# Patient Record
Sex: FEMALE | Race: WHITE | NOT HISPANIC OR LATINO | Employment: FULL TIME | URBAN - METROPOLITAN AREA
[De-identification: names, ages, dates, MRNs, and addresses within clinical notes are randomized per-mention and may not be internally consistent; named-entity substitution may affect disease eponyms.]

---

## 2023-03-02 LAB — URINE CULTURE: NORMAL

## 2023-03-14 LAB
ABO GROUP (TYPE) IN BLOOD: NORMAL
ANTIBODY SCREEN: NORMAL
ERYTHROCYTE DISTRIBUTION WIDTH (RATIO) BY AUTOMATED COUNT: 13 % (ref 11.5–14.5)
ERYTHROCYTE MEAN CORPUSCULAR HEMOGLOBIN CONCENTRATION (G/DL) BY AUTOMATED: 32.7 G/DL (ref 32–36)
ERYTHROCYTE MEAN CORPUSCULAR VOLUME (FL) BY AUTOMATED COUNT: 91 FL (ref 80–100)
ERYTHROCYTES (10*6/UL) IN BLOOD BY AUTOMATED COUNT: 4.32 X10E12/L (ref 4–5.2)
HEMATOCRIT (%) IN BLOOD BY AUTOMATED COUNT: 39.5 % (ref 36–46)
HEMOGLOBIN (G/DL) IN BLOOD: 12.9 G/DL (ref 12–16)
HEPATITIS B VIRUS SURFACE AG PRESENCE IN SERUM: NONREACTIVE
HEPATITIS C VIRUS AB PRESENCE IN SERUM: NONREACTIVE
HIV 1/ 2 AG/AB SCREEN: NONREACTIVE
LEUKOCYTES (10*3/UL) IN BLOOD BY AUTOMATED COUNT: 8.2 X10E9/L (ref 4.4–11.3)
PLATELETS (10*3/UL) IN BLOOD AUTOMATED COUNT: 208 X10E9/L (ref 150–450)
REFLEX ADDED, ANEMIA PANEL: NORMAL
RH FACTOR: NORMAL
RUBELLA VIRUS IGG AB: POSITIVE
SYPHILIS TOTAL AB: NONREACTIVE

## 2023-04-04 LAB
CHLAMYDIA TRACH., AMPLIFIED: NEGATIVE
N. GONORRHEA, AMPLIFIED: NEGATIVE

## 2023-06-26 LAB
ERYTHROCYTE DISTRIBUTION WIDTH (RATIO) BY AUTOMATED COUNT: 12.5 % (ref 11.5–14.5)
ERYTHROCYTE MEAN CORPUSCULAR HEMOGLOBIN CONCENTRATION (G/DL) BY AUTOMATED: 32 G/DL (ref 32–36)
ERYTHROCYTE MEAN CORPUSCULAR VOLUME (FL) BY AUTOMATED COUNT: 98 FL (ref 80–100)
ERYTHROCYTES (10*6/UL) IN BLOOD BY AUTOMATED COUNT: 3.88 X10E12/L (ref 4–5.2)
GLUCOSE, 1 HR SCREEN, PREG: 99 MG/DL
HEMATOCRIT (%) IN BLOOD BY AUTOMATED COUNT: 38.1 % (ref 36–46)
HEMOGLOBIN (G/DL) IN BLOOD: 12.2 G/DL (ref 12–16)
LEUKOCYTES (10*3/UL) IN BLOOD BY AUTOMATED COUNT: 10.1 X10E9/L (ref 4.4–11.3)
PLATELETS (10*3/UL) IN BLOOD AUTOMATED COUNT: 198 X10E9/L (ref 150–450)
REFLEX ADDED, ANEMIA PANEL: ABNORMAL

## 2023-08-26 PROBLEM — Z72.89 OTHER PROBLEMS RELATED TO LIFESTYLE: Status: ACTIVE | Noted: 2023-08-26

## 2023-08-26 PROBLEM — L72.3 SEBACEOUS CYST: Status: ACTIVE | Noted: 2023-08-26

## 2023-08-26 PROBLEM — B96.89 BACTERIAL VAGINOSIS: Status: ACTIVE | Noted: 2023-08-26

## 2023-08-26 PROBLEM — N76.0 BACTERIAL VAGINOSIS: Status: ACTIVE | Noted: 2023-08-26

## 2023-08-26 PROBLEM — R87.612 LOW GRADE SQUAMOUS INTRAEPITHELIAL LESION (LGSIL) ON CERVICAL PAP SMEAR: Status: ACTIVE | Noted: 2023-08-26

## 2023-08-26 PROBLEM — N39.0 UTI (URINARY TRACT INFECTION): Status: ACTIVE | Noted: 2023-08-26

## 2023-08-26 PROBLEM — R53.83 FATIGUE: Status: ACTIVE | Noted: 2023-08-26

## 2023-08-26 PROBLEM — H92.03 OTALGIA OF BOTH EARS: Status: ACTIVE | Noted: 2023-08-26

## 2023-08-26 PROBLEM — R10.2 FEMALE PELVIC PAIN: Status: ACTIVE | Noted: 2023-08-26

## 2023-08-26 PROBLEM — D64.9 ANEMIA: Status: ACTIVE | Noted: 2023-08-26

## 2023-08-26 PROBLEM — R39.89 SUSPECTED UTI: Status: ACTIVE | Noted: 2023-08-26

## 2023-08-26 RX ORDER — CLINDAMYCIN PHOSPHATE, BENZOYL PEROXIDE 25; 10 MG/G; MG/G
GEL TOPICAL
COMMUNITY
End: 2023-10-04 | Stop reason: WASHOUT

## 2023-08-26 RX ORDER — LANOLIN ALCOHOL/MO/W.PET/CERES
1 CREAM (GRAM) TOPICAL DAILY
COMMUNITY
End: 2023-09-23 | Stop reason: ALTCHOICE

## 2023-08-26 RX ORDER — FERROUS SULFATE 325(65) MG
TABLET ORAL
COMMUNITY
End: 2023-10-08 | Stop reason: HOSPADM

## 2023-08-26 RX ORDER — NITROFURANTOIN 25; 75 MG/1; MG/1
1 CAPSULE ORAL 2 TIMES DAILY
COMMUNITY
Start: 2022-06-29 | End: 2023-09-23 | Stop reason: ALTCHOICE

## 2023-09-18 LAB — GROUP B STREP SCREEN: NORMAL

## 2023-10-04 ENCOUNTER — ROUTINE PRENATAL (OUTPATIENT)
Dept: OBSTETRICS AND GYNECOLOGY | Facility: CLINIC | Age: 28
End: 2023-10-04
Payer: COMMERCIAL

## 2023-10-04 VITALS
SYSTOLIC BLOOD PRESSURE: 118 MMHG | WEIGHT: 174.38 LBS | DIASTOLIC BLOOD PRESSURE: 74 MMHG | BODY MASS INDEX: 29.02 KG/M2

## 2023-10-04 DIAGNOSIS — Z3A.39 39 WEEKS GESTATION OF PREGNANCY (HHS-HCC): Primary | ICD-10-CM

## 2023-10-04 PROBLEM — Z72.89 OTHER PROBLEMS RELATED TO LIFESTYLE: Status: RESOLVED | Noted: 2023-08-26 | Resolved: 2023-10-04

## 2023-10-04 PROBLEM — R87.612 LOW GRADE SQUAMOUS INTRAEPITHELIAL LESION (LGSIL) ON CERVICAL PAP SMEAR: Status: RESOLVED | Noted: 2023-08-26 | Resolved: 2023-10-04

## 2023-10-04 PROBLEM — R53.83 FATIGUE: Status: RESOLVED | Noted: 2023-08-26 | Resolved: 2023-10-04

## 2023-10-04 PROBLEM — Z34.03 ENCOUNTER FOR SUPERVISION OF NORMAL FIRST PREGNANCY IN THIRD TRIMESTER (HHS-HCC): Status: ACTIVE | Noted: 2023-10-04

## 2023-10-04 PROBLEM — L72.3 SEBACEOUS CYST: Status: RESOLVED | Noted: 2023-08-26 | Resolved: 2023-10-04

## 2023-10-04 PROBLEM — N76.0 BACTERIAL VAGINOSIS: Status: RESOLVED | Noted: 2023-08-26 | Resolved: 2023-10-04

## 2023-10-04 PROBLEM — R10.2 FEMALE PELVIC PAIN: Status: RESOLVED | Noted: 2023-08-26 | Resolved: 2023-10-04

## 2023-10-04 PROBLEM — B96.89 BACTERIAL VAGINOSIS: Status: RESOLVED | Noted: 2023-08-26 | Resolved: 2023-10-04

## 2023-10-04 PROBLEM — H92.03 OTALGIA OF BOTH EARS: Status: RESOLVED | Noted: 2023-08-26 | Resolved: 2023-10-04

## 2023-10-04 PROBLEM — N39.0 UTI (URINARY TRACT INFECTION): Status: RESOLVED | Noted: 2023-08-26 | Resolved: 2023-10-04

## 2023-10-04 PROBLEM — R39.89 SUSPECTED UTI: Status: RESOLVED | Noted: 2023-08-26 | Resolved: 2023-10-04

## 2023-10-04 PROCEDURE — 0501F PRENATAL FLOW SHEET: CPT | Performed by: OBSTETRICS & GYNECOLOGY

## 2023-10-04 NOTE — PROGRESS NOTES
Routine ob at 39.1 wks.  Feeling well. Good FM.  Normal bp today.  Repeat SVE 2/70/-2, vertex.  Labor precautions.  Desires spontaneous labor.  Plan membrane stripping next week, desires spontaneous labor, will set up IOL at 40.6 if still pregnant at n/v.

## 2023-10-05 ENCOUNTER — TELEPHONE (OUTPATIENT)
Dept: OBSTETRICS AND GYNECOLOGY | Facility: CLINIC | Age: 28
End: 2023-10-05
Payer: COMMERCIAL

## 2023-10-05 NOTE — TELEPHONE ENCOUNTER
39 weeks pregnant had a big chunk of red blood and then nothing. Patient states she is not jamaal, had an  cervical check done yesterday. Please advise.

## 2023-10-06 ENCOUNTER — ANESTHESIA (OUTPATIENT)
Dept: OBSTETRICS AND GYNECOLOGY | Facility: HOSPITAL | Age: 28
End: 2023-10-06

## 2023-10-06 ENCOUNTER — HOSPITAL ENCOUNTER (INPATIENT)
Facility: HOSPITAL | Age: 28
LOS: 2 days | Discharge: HOME | End: 2023-10-08
Attending: OBSTETRICS & GYNECOLOGY | Admitting: OBSTETRICS & GYNECOLOGY
Payer: COMMERCIAL

## 2023-10-06 ENCOUNTER — ANESTHESIA EVENT (OUTPATIENT)
Dept: OBSTETRICS AND GYNECOLOGY | Facility: HOSPITAL | Age: 28
End: 2023-10-06

## 2023-10-06 PROBLEM — Z37.9 NORMAL LABOR (HHS-HCC): Status: ACTIVE | Noted: 2023-10-06

## 2023-10-06 PROBLEM — Z34.03 ENCOUNTER FOR SUPERVISION OF NORMAL FIRST PREGNANCY IN THIRD TRIMESTER (HHS-HCC): Status: RESOLVED | Noted: 2023-10-04 | Resolved: 2023-10-06

## 2023-10-06 PROBLEM — Z34.90 TERM PREGNANCY (HHS-HCC): Status: ACTIVE | Noted: 2023-10-06

## 2023-10-06 PROBLEM — D64.9 ANEMIA: Status: RESOLVED | Noted: 2023-08-26 | Resolved: 2023-10-06

## 2023-10-06 LAB
ABO GROUP (TYPE) IN BLOOD: NORMAL
ANTIBODY SCREEN: NORMAL
ERYTHROCYTE [DISTWIDTH] IN BLOOD BY AUTOMATED COUNT: 12.9 % (ref 11.5–14.5)
HCT VFR BLD AUTO: 40.3 % (ref 36–46)
HGB BLD-MCNC: 13.3 G/DL (ref 12–16)
MCH RBC QN AUTO: 30.3 PG (ref 26–34)
MCHC RBC AUTO-ENTMCNC: 33 G/DL (ref 32–36)
MCV RBC AUTO: 92 FL (ref 80–100)
NRBC BLD-RTO: 0 /100 WBCS (ref 0–0)
PLATELET # BLD AUTO: 180 X10*3/UL (ref 150–450)
PMV BLD AUTO: 10 FL (ref 7.5–11.5)
RBC # BLD AUTO: 4.39 X10*6/UL (ref 4–5.2)
RH FACTOR (ANTIGEN D): NORMAL
T PALLIDUM AB SER QL: NONREACTIVE
WBC # BLD AUTO: 15.5 X10*3/UL (ref 4.4–11.3)

## 2023-10-06 PROCEDURE — 86780 TREPONEMA PALLIDUM: CPT | Performed by: OBSTETRICS & GYNECOLOGY

## 2023-10-06 PROCEDURE — 86901 BLOOD TYPING SEROLOGIC RH(D): CPT | Performed by: OBSTETRICS & GYNECOLOGY

## 2023-10-06 PROCEDURE — 7100000016 HC LABOR RECOVERY PER HOUR

## 2023-10-06 PROCEDURE — 59400 OBSTETRICAL CARE: CPT | Performed by: OBSTETRICS & GYNECOLOGY

## 2023-10-06 PROCEDURE — 59050 FETAL MONITOR W/REPORT: CPT

## 2023-10-06 PROCEDURE — 7210000002 HC LABOR PER HOUR

## 2023-10-06 PROCEDURE — 59409 OBSTETRICAL CARE: CPT | Performed by: OBSTETRICS & GYNECOLOGY

## 2023-10-06 PROCEDURE — 1120000001 HC OB PRIVATE ROOM DAILY

## 2023-10-06 PROCEDURE — 85027 COMPLETE CBC AUTOMATED: CPT | Performed by: OBSTETRICS & GYNECOLOGY

## 2023-10-06 PROCEDURE — 0HQ9XZZ REPAIR PERINEUM SKIN, EXTERNAL APPROACH: ICD-10-PCS | Performed by: OBSTETRICS & GYNECOLOGY

## 2023-10-06 PROCEDURE — 36415 COLL VENOUS BLD VENIPUNCTURE: CPT | Performed by: OBSTETRICS & GYNECOLOGY

## 2023-10-06 RX ORDER — METHYLERGONOVINE MALEATE 0.2 MG/ML
0.2 INJECTION INTRAVENOUS ONCE AS NEEDED
Status: DISCONTINUED | OUTPATIENT
Start: 2023-10-06 | End: 2023-10-07

## 2023-10-06 RX ORDER — TRANEXAMIC ACID 100 MG/ML
1000 INJECTION, SOLUTION INTRAVENOUS ONCE AS NEEDED
Status: DISCONTINUED | OUTPATIENT
Start: 2023-10-06 | End: 2023-10-07

## 2023-10-06 RX ORDER — LIDOCAINE HYDROCHLORIDE 10 MG/ML
30 INJECTION INFILTRATION; PERINEURAL ONCE AS NEEDED
Status: DISCONTINUED | OUTPATIENT
Start: 2023-10-06 | End: 2023-10-07

## 2023-10-06 RX ORDER — METOCLOPRAMIDE 10 MG/1
10 TABLET ORAL EVERY 6 HOURS PRN
Status: DISCONTINUED | OUTPATIENT
Start: 2023-10-06 | End: 2023-10-07

## 2023-10-06 RX ORDER — CARBOPROST TROMETHAMINE 250 UG/ML
250 INJECTION, SOLUTION INTRAMUSCULAR ONCE AS NEEDED
Status: DISCONTINUED | OUTPATIENT
Start: 2023-10-06 | End: 2023-10-07

## 2023-10-06 RX ORDER — MISOPROSTOL 200 UG/1
800 TABLET ORAL ONCE AS NEEDED
Status: DISCONTINUED | OUTPATIENT
Start: 2023-10-06 | End: 2023-10-07

## 2023-10-06 RX ORDER — NIFEDIPINE 10 MG/1
10 CAPSULE ORAL ONCE AS NEEDED
Status: DISCONTINUED | OUTPATIENT
Start: 2023-10-06 | End: 2023-10-07

## 2023-10-06 RX ORDER — ONDANSETRON 4 MG/1
4 TABLET, FILM COATED ORAL EVERY 6 HOURS PRN
Status: DISCONTINUED | OUTPATIENT
Start: 2023-10-06 | End: 2023-10-07

## 2023-10-06 RX ORDER — OXYTOCIN 10 [USP'U]/ML
10 INJECTION, SOLUTION INTRAMUSCULAR; INTRAVENOUS ONCE AS NEEDED
Status: DISCONTINUED | OUTPATIENT
Start: 2023-10-06 | End: 2023-10-07

## 2023-10-06 RX ORDER — METOCLOPRAMIDE HYDROCHLORIDE 5 MG/ML
10 INJECTION INTRAMUSCULAR; INTRAVENOUS EVERY 6 HOURS PRN
Status: DISCONTINUED | OUTPATIENT
Start: 2023-10-06 | End: 2023-10-07

## 2023-10-06 RX ORDER — HYDRALAZINE HYDROCHLORIDE 20 MG/ML
5 INJECTION INTRAMUSCULAR; INTRAVENOUS ONCE AS NEEDED
Status: DISCONTINUED | OUTPATIENT
Start: 2023-10-06 | End: 2023-10-07

## 2023-10-06 RX ORDER — LABETALOL HYDROCHLORIDE 5 MG/ML
20 INJECTION, SOLUTION INTRAVENOUS ONCE AS NEEDED
Status: DISCONTINUED | OUTPATIENT
Start: 2023-10-06 | End: 2023-10-07

## 2023-10-06 RX ORDER — TERBUTALINE SULFATE 1 MG/ML
0.25 INJECTION SUBCUTANEOUS ONCE AS NEEDED
Status: DISCONTINUED | OUTPATIENT
Start: 2023-10-06 | End: 2023-10-07

## 2023-10-06 RX ORDER — LIDOCAINE HYDROCHLORIDE 10 MG/ML
INJECTION INFILTRATION; PERINEURAL
Status: DISPENSED
Start: 2023-10-06 | End: 2023-10-06

## 2023-10-06 RX ORDER — ONDANSETRON HYDROCHLORIDE 2 MG/ML
4 INJECTION, SOLUTION INTRAVENOUS EVERY 6 HOURS PRN
Status: DISCONTINUED | OUTPATIENT
Start: 2023-10-06 | End: 2023-10-07

## 2023-10-06 RX ORDER — OXYTOCIN/0.9 % SODIUM CHLORIDE 30/500 ML
60 PLASTIC BAG, INJECTION (ML) INTRAVENOUS
Status: DISCONTINUED | OUTPATIENT
Start: 2023-10-06 | End: 2023-10-07

## 2023-10-06 RX ORDER — LOPERAMIDE HYDROCHLORIDE 2 MG/1
4 CAPSULE ORAL EVERY 2 HOUR PRN
Status: DISCONTINUED | OUTPATIENT
Start: 2023-10-06 | End: 2023-10-07

## 2023-10-06 RX ORDER — MINERAL OIL
OIL (ML) ORAL
Status: DISPENSED
Start: 2023-10-06 | End: 2023-10-06

## 2023-10-06 RX ORDER — SODIUM CHLORIDE, SODIUM LACTATE, POTASSIUM CHLORIDE, CALCIUM CHLORIDE 600; 310; 30; 20 MG/100ML; MG/100ML; MG/100ML; MG/100ML
125 INJECTION, SOLUTION INTRAVENOUS CONTINUOUS
Status: DISCONTINUED | OUTPATIENT
Start: 2023-10-06 | End: 2023-10-07

## 2023-10-06 SDOH — SOCIAL STABILITY: SOCIAL INSECURITY: ARE THERE ANY APPARENT SIGNS OF INJURIES/BEHAVIORS THAT COULD BE RELATED TO ABUSE/NEGLECT?: NO

## 2023-10-06 SDOH — SOCIAL STABILITY: SOCIAL INSECURITY: ABUSE SCREEN: ADULT

## 2023-10-06 SDOH — HEALTH STABILITY: MENTAL HEALTH: WERE YOU ABLE TO COMPLETE ALL THE BEHAVIORAL HEALTH SCREENINGS?: YES

## 2023-10-06 SDOH — HEALTH STABILITY: MENTAL HEALTH: WISH TO BE DEAD (PAST 1 MONTH): NO

## 2023-10-06 SDOH — ECONOMIC STABILITY: HOUSING INSECURITY: DO YOU FEEL UNSAFE GOING BACK TO THE PLACE WHERE YOU ARE LIVING?: NO

## 2023-10-06 SDOH — SOCIAL STABILITY: SOCIAL INSECURITY: DOES ANYONE TRY TO KEEP YOU FROM HAVING/CONTACTING OTHER FRIENDS OR DOING THINGS OUTSIDE YOUR HOME?: NO

## 2023-10-06 SDOH — HEALTH STABILITY: MENTAL HEALTH: NON-SPECIFIC ACTIVE SUICIDAL THOUGHTS (PAST 1 MONTH): NO

## 2023-10-06 SDOH — HEALTH STABILITY: MENTAL HEALTH: SUICIDAL BEHAVIOR (LIFETIME): NO

## 2023-10-06 SDOH — SOCIAL STABILITY: SOCIAL INSECURITY: DO YOU FEEL ANYONE HAS EXPLOITED OR TAKEN ADVANTAGE OF YOU FINANCIALLY OR OF YOUR PERSONAL PROPERTY?: NO

## 2023-10-06 SDOH — SOCIAL STABILITY: SOCIAL INSECURITY: HAS ANYONE EVER THREATENED TO HURT YOUR FAMILY OR YOUR PETS?: NO

## 2023-10-06 SDOH — SOCIAL STABILITY: SOCIAL INSECURITY: ARE YOU OR HAVE YOU BEEN THREATENED OR ABUSED PHYSICALLY, EMOTIONALLY, OR SEXUALLY BY ANYONE?: NO

## 2023-10-06 SDOH — SOCIAL STABILITY: SOCIAL INSECURITY: PHYSICAL ABUSE: DENIES

## 2023-10-06 SDOH — SOCIAL STABILITY: SOCIAL INSECURITY: HAVE YOU HAD THOUGHTS OF HARMING ANYONE ELSE?: NO

## 2023-10-06 SDOH — SOCIAL STABILITY: SOCIAL INSECURITY: VERBAL ABUSE: DENIES

## 2023-10-06 ASSESSMENT — PAIN SCALES - GENERAL
PAINLEVEL_OUTOF10: 0 - NO PAIN
PAINLEVEL_OUTOF10: 1
PAINLEVEL_OUTOF10: 0 - NO PAIN

## 2023-10-06 ASSESSMENT — LIFESTYLE VARIABLES
AUDIT-C TOTAL SCORE: 0
HOW MANY STANDARD DRINKS CONTAINING ALCOHOL DO YOU HAVE ON A TYPICAL DAY: PATIENT DOES NOT DRINK
HOW OFTEN DO YOU HAVE 6 OR MORE DRINKS ON ONE OCCASION: NEVER
HOW OFTEN DO YOU HAVE A DRINK CONTAINING ALCOHOL: NEVER
SKIP TO QUESTIONS 9-10: 1
AUDIT-C TOTAL SCORE: 0

## 2023-10-06 ASSESSMENT — ACTIVITIES OF DAILY LIVING (ADL)
PATIENT'S MEMORY ADEQUATE TO SAFELY COMPLETE DAILY ACTIVITIES?: YES
BATHING: INDEPENDENT
WALKS IN HOME: INDEPENDENT
FEEDING YOURSELF: INDEPENDENT
JUDGMENT_ADEQUATE_SAFELY_COMPLETE_DAILY_ACTIVITIES: YES
HEARING - RIGHT EAR: FUNCTIONAL
TOILETING: INDEPENDENT
HEARING - LEFT EAR: FUNCTIONAL
ADEQUATE_TO_COMPLETE_ADL: YES
GROOMING: INDEPENDENT
DRESSING YOURSELF: INDEPENDENT

## 2023-10-06 ASSESSMENT — PATIENT HEALTH QUESTIONNAIRE - PHQ9
SUM OF ALL RESPONSES TO PHQ9 QUESTIONS 1 & 2: 0
2. FEELING DOWN, DEPRESSED OR HOPELESS: NOT AT ALL
1. LITTLE INTEREST OR PLEASURE IN DOING THINGS: NOT AT ALL

## 2023-10-06 ASSESSMENT — PAIN DESCRIPTION - DESCRIPTORS: DESCRIPTORS: CRAMPING

## 2023-10-06 NOTE — CARE PLAN
The patient's goals for the shift include bond with baby    The clinical goals for the shift include return to postpartum state      Problem: Postpartum  Goal: Experiences normal postpartum course  Outcome: Progressing  Goal: Appropriate maternal -  bonding  Outcome: Progressing  Goal: Establish and maintain infant feeding pattern for adequate nutrition  Outcome: Progressing  Goal: Incisions, wounds, or drain sites healing without S/S of infection  Outcome: Progressing  Goal: No s/sx infection  Outcome: Progressing  Goal: No s/sx of hemorrhage  Outcome: Progressing  Goal: Minimal s/sx of HDP and BP<160/110  Outcome: Progressing     Problem: Hypertensive Disorder of Pregnancy (HDP)  Goal: Minimal s/sx of HDP and BP<160/110  Outcome: Progressing  Goal: Adequate urine output (0.5 ml/kg/hr)  Outcome: Progressing     Problem: Nausea/Vomiting  Goal: Adequate urine output (0.5 ml/kg/hr)  Outcome: Progressing  Goal: Free from nausea/vomiting  Outcome: Progressing  Goal: Tolerates prescribed diet  Outcome: Progressing  Goal: Weight maintenance or gain  Outcome: Progressing  Goal: Achieve/maintain normal electrolyte level  Outcome: Progressing     Problem: Pain - Adult  Goal: Verbalizes/displays adequate comfort level or baseline comfort level  Outcome: Progressing     Problem: Safety - Adult  Goal: Free from fall injury  Outcome: Progressing     Problem: Discharge Planning  Goal: Discharge to home or other facility with appropriate resources  Outcome: Progressing     Problem: Pain  Goal: Takes deep breaths with improved pain control throughout the shift  Outcome: Progressing  Goal: Turns in bed with improved pain control throughout the shift  Outcome: Progressing

## 2023-10-06 NOTE — L&D DELIVERY NOTE
OB Delivery Note  10/6/2023  Madeline M Stanley  28 y.o.     Gestational Age: 39w3d  /Para:   Estimated Blood Loss:   Delivery Blood Loss  10/06/23 0330 - 10/06/23 1153      None            Quantitative Blood Loss:      Дмитрий Angel [[id number]]      Labor Events    Sac identifier: Sac 1  Rupture type: Spontaneous  Fluid color: Clear  Fluid odor: None  Labor type: Spontaneous Onset of Labor  Labor allowed to proceed with plans for an attempted vaginal birth?: Yes  Augmentation: None  Complications: None       Labor Event Times    Labor onset date/time: 10/6/2023 0330  Dilation complete date/time: 10/6/2023 0815  Start pushing date/time: 10/6/2023 0815       Placenta    Placenta delivery date/time: 10/6/2023 112  Placenta removal: Spontaneous  Placenta appearance: Intact  Placenta disposition: discarded       Cord    Vessels: 3 vessels  Complications: None  Delayed cord clamping?: Yes  Cord blood disposition: Discarded  Gases sent?: No  Stem cell collection (by provider): No       Lacerations    Episiotomy: None  Perineal laceration: 1st  Periurethral laceration?: Yes  Repair suture: 3-0 Synthetic Suture       Anesthesia    Method: None       Operative Delivery    Forceps attempted?: No  Vacuum extractor attempted?: No       Shoulder Dystocia    Shoulder dystocia present?: No        Delivery    Time head delivered: 10/6/2023 11:25:00  Birth date/time: 10/6/2023 11:25:10  Delivery type: Vaginal, Spontaneous  Complications: None       Resuscitation    Method: None       Apgars    Living status: Living  Apgar Component Scores:  1 min.:  5 min.:  10 min.:  15 min.:  20 min.:    Skin color:  1  1       Heart rate:  2  2       Reflex irritability:  2  2       Muscle tone:  2  2       Respiratory effort:  2  2       Total:  9  9       Apgars assigned by: RAFI LAINEZ       Delivery Providers    Delivering clinician:    Provider Role    Mikaela Wyatt RN Delivery Nurse    Kyleigh Lainez RN Nurse  Nurse    Terese PARR MD Obstetrician/Gynecologist               First degree laceration repaired, right periurethral laceration not requiring suturing.  Terese Orozco MD

## 2023-10-06 NOTE — H&P
Obstetrical Admission History and Physical     Georgia Angel is a 28 y.o.  at 39w3d. KADEEM: 10/10/2023, by Last Menstrual Period. Estimated fetal weight: 7# 8 oz. She has had prenatal care with Dr. Gilmore .    Chief Complaint: Contractions    Assessment/Plan    Normal Labor  Planning NCB  Admit   Monitor expectantly     Principal Problem:    Normal labor      Pregnancy Problems (from 23 to present)       Problem Noted Resolved    Encounter for supervision of normal first pregnancy in third trimester 10/4/2023 by Erendira Gilmore MD No    Priority:  Medium      39 weeks gestation of pregnancy 10/4/2023 by Erendira Gilmore MD No    Priority:  Medium            Options for delivery have been discussed with the patient and she elects a vaginal delivery.  Labor has been discussed in detail. The risks, benefits, complications, alternatives, expected outcomes, potential problems during recuperation and recovery, and the risks of not performing the procedure were discussed with the patient. The patient stated understanding that the risks of delivery include, but are not limited to: death; reaction to medications; injury to bowel, bladder, ureters, uterus, cervix, vagina, and other pelvic and abdominal structures, infection; blood loss and possible need for transfusion; and potential need for surgery, including hysterectomy. The risks of injury to the infant during delivery were also discussed. All questions were answered. The patient is wishing to continue with plans for a vaginal delivery.    Admit to inpatient status. I anticipate that this patient will require a stay exceeding at least 2 midnights for delivery and postpartum.  Active management of rupture of membranes.  Management of pregnancy complications, as indicated.    Batool Ho presents to Labor & Delivery with Spontaneous Rupture of Membranes of   clear fluid at 0330  hr on 10/6/23 . Good fetal movement. C/O bloody show., Having contractions q  "4 minutes.              Obstetrical History   OB History    Para Term  AB Living   1 0 0 0 0 0   SAB IAB Ectopic Multiple Live Births   0 0 0 0 0      # Outcome Date GA Lbr Harry/2nd Weight Sex Delivery Anes PTL Lv   1 Current                Past Medical History  Past Medical History:   Diagnosis Date    Anemia, unspecified 2013    Anemia    Encounter for gynecological examination (general) (routine) without abnormal findings 08/10/2020    Encounter for well woman exam with routine gynecological exam    History of abnormal cervical Pap smear         Past Surgical History   Past Surgical History:   Procedure Laterality Date    MR HEAD ANGIO WO IV CONTRAST  2023    MR HEAD ANGIO WO IV CONTRAST 2023 [facility] MRI       Social History  Social History     Tobacco Use    Smoking status: Never    Smokeless tobacco: Never   Substance Use Topics    Alcohol use: Not Currently     Substance and Sexual Activity   Drug Use Never       Allergies  Animal dander, Milk containing products (dairy), and Penicillins     Medications  Medications Prior to Admission   Medication Sig Dispense Refill Last Dose    ferrous sulfate 325 (65 Fe) MG tablet Take by mouth.       PNV no.153/FA/om3/dha/epa/fish (PRENATAL GUMMIES ORAL) Take by mouth.          Objective    Last Vitals  /82   Pulse 88   Ht 1.651 m (5' 5\")   Wt 79.1 kg   LMP 2023   SpO2 97%   BMI 29.02 kg/m²      Physical Examination  GENERAL: Examination reveals a well developed, well nourished, gravid female in no acute distress. She is alert and cooperative.  SKIN: warm, dry, no rashes  CV: Regular, rate, and rhythm  RESP: Lungs clear to auscultation  ABDOMEN: soft, gravid, nontender, nondistended, no abnormal masses, no epigastric pain  : no suprapubic or flank tenderness  EXT Genitalia - normal, no lesions  EXT: no swelling of lower extremities, no calf tenderness  NEURO: normal reflexes  PSYCH: A&O x3, normal affect    Cervical " Exam  Dilation: 5  Effacement (%): 90  Fetal Station: -1  Cervical Consistency: Soft  Cervical Position: Anterior  Presentation: Vertex  OB Examiner: Ryan RAMIREZ          Fetal Assessment  Movement: Present  Mode: External US  Multiple Births: No    Duck reading:      The fetus is in a vertex presentation, determined by vaginal exam  Current Estimated Fetal Weight 7# 8 oz lbs established by vaginal exam    Lab Review  Results for orders placed or performed in visit on 09/15/23   Group B Streptococcus (GBS) Prenatal Screen, Culture    Specimen: Genital    VAGINAL   Result Value Ref Range    Group B Strep Screen NEGATIVE FOR GROUP B BETA STREP.      Mom Prenatal Results      Prenatal Results      1st Trimester       Test Value Reference Range Date Time    CBC  0.0 /100 WBC 0.0 - 0.0 09/21/23 0146       0.0 /100 WBC 0.0 - 0.0 09/20/23 1912    Hemoglobin  12.9 g/dL 12.0 - 16.0 09/21/23 0146       12.4 g/dL 12.0 - 16.0 09/20/23 1912       12.2 g/dL 12.0 - 16.0 06/26/23 0913       12.9 g/dL 12.0 - 16.0 03/14/23 0918    Hematocrit  40.3 % 36.0 - 46.0 09/21/23 0146       37.2 % 36.0 - 46.0 09/20/23 1912       38.1 % 36.0 - 46.0 06/26/23 0913       39.5 % 36.0 - 46.0 03/14/23 0918    Type and Screen        Syphilis Screening with Reflex        Rubella IgG Antibody        Hemoglobin A1C        Hepatitis C Antibody Test  NONREACTIVE  NONREACTIVE 03/14/23 0918    Basic Metabolic Panel  65 mg/dL 74 - 99 09/21/23 0254       77 mg/dL 74 - 99 09/20/23 1912    TSH with Reflex to Free T4 if Abnormal  2.71 mIU/L 0.44 - 3.98 01/11/22 0716    Maternal Urine Toxicology Screen        HIV 1/2 ANTIGEN/ANTIBODY SCREEN WITH REFLEX TO CONFIRMATION        HepBsAG        Group B Strep Screen        Urinalysis with culture if indicated  NEGATIVE mg/dL NEGATIVE 09/21/23 0146       <4 mg/dL 5 - 24 09/20/23 1933    VARICELLA ZOSTER ANTIBODY, IGG        Gonorrhea Screen        Chlamydia Antibodies IGG        Chlamydia Antibodies IGM        CMP  5 mg/dL  6 - 23 09/21/23 0254       6 mg/dL 6 - 23 09/20/23 1912    Oral GTT  99 mg/dL <135 06/26/23 0913    Glucose 3 hour pregnancy        Total Protein  6.4 g/dL 6.4 - 8.2 09/21/23 0254       6.7 g/dL 6.4 - 8.2 09/20/23 1912    Creatinine  11.9 mg/dL 20.0 - 320.0 09/20/23 1933          2nd Trimester       Test Value Reference Range Date Time    Hemaglobin  12.9 g/dL 12.0 - 16.0 09/21/23 0146       12.4 g/dL 12.0 - 16.0 09/20/23 1912       12.2 g/dL 12.0 - 16.0 06/26/23 0913       12.9 g/dL 12.0 - 16.0 03/14/23 0918    Hematocrit  40.3 % 36.0 - 46.0 09/21/23 0146       37.2 % 36.0 - 46.0 09/20/23 1912       38.1 % 36.0 - 46.0 06/26/23 0913       39.5 % 36.0 - 46.0 03/14/23 0918    Syphilis screening with reflex VDRL        Gonorrhea screen        Chlamydia Antibodies IGM        Chlamydia Antibodies IGG        Oral GTT  99 mg/dL <135 06/26/23 0913    Glucose 3 hour pregnancy        Antibody screen              3rd Trimester       Test Value Reference Range Date Time    Hemoglobin  12.9 g/dL 12.0 - 16.0 09/21/23 0146       12.4 g/dL 12.0 - 16.0 09/20/23 1912       12.2 g/dL 12.0 - 16.0 06/26/23 0913       12.9 g/dL 12.0 - 16.0 03/14/23 0918    Hematocrit  40.3 % 36.0 - 46.0 09/21/23 0146       37.2 % 36.0 - 46.0 09/20/23 1912       38.1 % 36.0 - 46.0 06/26/23 0913       39.5 % 36.0 - 46.0 03/14/23 0918    Glucose 3 hour pregnancy        Group B Strep              Congenital Disease Screening       Test Value Reference Range Date Time    First Trimester Screen        Sickle Cell Screen        Triple Test        [facility]        Cystic Fibrosis Carrier Study              TORCH       Test Value Reference Range Date Time    Toxoplasmosis, IgM        TOXOPLASMA GONDII ANTIBODY, IGG        Rubella        CYTOMEGALOVIRUS ANTIBODY, IGM        CYTOMEGALOVIRUS ANTIBODY, IGG        HSV Type 1 & 2              Legend    ^: Historical                        Juan Luis Davis MD

## 2023-10-06 NOTE — CARE PLAN
The patient's goals for the shift include  delivering baby safely and bonding with baby     The clinical goals for the shift include  fetal and maternal status remain reassuring during birth, recovery and postpartum    Over the shift, the patient did not make progress toward the following goals. Barriers to progression include ***. Recommendations to address these barriers include ***.

## 2023-10-07 PROCEDURE — 2500000001 HC RX 250 WO HCPCS SELF ADMINISTERED DRUGS (ALT 637 FOR MEDICARE OP): Performed by: OBSTETRICS & GYNECOLOGY

## 2023-10-07 PROCEDURE — 1120000001 HC OB PRIVATE ROOM DAILY

## 2023-10-07 RX ORDER — DIPHENHYDRAMINE HCL 25 MG
25 CAPSULE ORAL EVERY 6 HOURS PRN
Status: DISCONTINUED | OUTPATIENT
Start: 2023-10-07 | End: 2023-10-08 | Stop reason: HOSPADM

## 2023-10-07 RX ORDER — ACETAMINOPHEN 325 MG/1
975 TABLET ORAL EVERY 6 HOURS SCHEDULED
Status: DISCONTINUED | OUTPATIENT
Start: 2023-10-07 | End: 2023-10-08 | Stop reason: HOSPADM

## 2023-10-07 RX ORDER — MISOPROSTOL 200 UG/1
800 TABLET ORAL ONCE AS NEEDED
Status: DISCONTINUED | OUTPATIENT
Start: 2023-10-07 | End: 2023-10-08 | Stop reason: HOSPADM

## 2023-10-07 RX ORDER — LIDOCAINE 560 MG/1
1 PATCH PERCUTANEOUS; TOPICAL; TRANSDERMAL
Status: DISCONTINUED | OUTPATIENT
Start: 2023-10-07 | End: 2023-10-08 | Stop reason: HOSPADM

## 2023-10-07 RX ORDER — BISACODYL 10 MG/1
10 SUPPOSITORY RECTAL DAILY PRN
Status: DISCONTINUED | OUTPATIENT
Start: 2023-10-07 | End: 2023-10-08 | Stop reason: HOSPADM

## 2023-10-07 RX ORDER — LABETALOL HYDROCHLORIDE 5 MG/ML
20 INJECTION, SOLUTION INTRAVENOUS ONCE AS NEEDED
Status: DISCONTINUED | OUTPATIENT
Start: 2023-10-07 | End: 2023-10-08 | Stop reason: HOSPADM

## 2023-10-07 RX ORDER — DIPHENHYDRAMINE HYDROCHLORIDE 50 MG/ML
25 INJECTION INTRAMUSCULAR; INTRAVENOUS EVERY 6 HOURS PRN
Status: DISCONTINUED | OUTPATIENT
Start: 2023-10-07 | End: 2023-10-08 | Stop reason: HOSPADM

## 2023-10-07 RX ORDER — TRANEXAMIC ACID 100 MG/ML
1000 INJECTION, SOLUTION INTRAVENOUS ONCE AS NEEDED
Status: DISCONTINUED | OUTPATIENT
Start: 2023-10-07 | End: 2023-10-08 | Stop reason: HOSPADM

## 2023-10-07 RX ORDER — ACETAMINOPHEN 325 MG/1
TABLET ORAL
Status: COMPLETED
Start: 2023-10-07 | End: 2023-10-07

## 2023-10-07 RX ORDER — HYDRALAZINE HYDROCHLORIDE 20 MG/ML
5 INJECTION INTRAMUSCULAR; INTRAVENOUS ONCE AS NEEDED
Status: DISCONTINUED | OUTPATIENT
Start: 2023-10-07 | End: 2023-10-08 | Stop reason: HOSPADM

## 2023-10-07 RX ORDER — METHYLERGONOVINE MALEATE 0.2 MG/ML
0.2 INJECTION INTRAVENOUS ONCE AS NEEDED
Status: DISCONTINUED | OUTPATIENT
Start: 2023-10-07 | End: 2023-10-08 | Stop reason: HOSPADM

## 2023-10-07 RX ORDER — IBUPROFEN 600 MG/1
600 TABLET ORAL EVERY 6 HOURS SCHEDULED
Status: DISCONTINUED | OUTPATIENT
Start: 2023-10-07 | End: 2023-10-08 | Stop reason: HOSPADM

## 2023-10-07 RX ORDER — NIFEDIPINE 10 MG/1
10 CAPSULE ORAL ONCE AS NEEDED
Status: DISCONTINUED | OUTPATIENT
Start: 2023-10-07 | End: 2023-10-08 | Stop reason: HOSPADM

## 2023-10-07 RX ORDER — SIMETHICONE 80 MG
80 TABLET,CHEWABLE ORAL 4 TIMES DAILY PRN
Status: DISCONTINUED | OUTPATIENT
Start: 2023-10-07 | End: 2023-10-08 | Stop reason: HOSPADM

## 2023-10-07 RX ORDER — ADHESIVE BANDAGE
10 BANDAGE TOPICAL
Status: DISCONTINUED | OUTPATIENT
Start: 2023-10-07 | End: 2023-10-08 | Stop reason: HOSPADM

## 2023-10-07 RX ORDER — CARBOPROST TROMETHAMINE 250 UG/ML
250 INJECTION, SOLUTION INTRAMUSCULAR ONCE AS NEEDED
Status: DISCONTINUED | OUTPATIENT
Start: 2023-10-07 | End: 2023-10-08 | Stop reason: HOSPADM

## 2023-10-07 RX ORDER — ONDANSETRON HYDROCHLORIDE 2 MG/ML
4 INJECTION, SOLUTION INTRAVENOUS EVERY 6 HOURS PRN
Status: DISCONTINUED | OUTPATIENT
Start: 2023-10-07 | End: 2023-10-08 | Stop reason: HOSPADM

## 2023-10-07 RX ORDER — POLYETHYLENE GLYCOL 3350 17 G/17G
17 POWDER, FOR SOLUTION ORAL 2 TIMES DAILY PRN
Status: DISCONTINUED | OUTPATIENT
Start: 2023-10-07 | End: 2023-10-08 | Stop reason: HOSPADM

## 2023-10-07 RX ORDER — ONDANSETRON 4 MG/1
4 TABLET, FILM COATED ORAL EVERY 6 HOURS PRN
Status: DISCONTINUED | OUTPATIENT
Start: 2023-10-07 | End: 2023-10-08 | Stop reason: HOSPADM

## 2023-10-07 RX ORDER — LOPERAMIDE HYDROCHLORIDE 2 MG/1
4 CAPSULE ORAL EVERY 2 HOUR PRN
Status: DISCONTINUED | OUTPATIENT
Start: 2023-10-07 | End: 2023-10-08 | Stop reason: HOSPADM

## 2023-10-07 RX ORDER — OXYTOCIN 10 [USP'U]/ML
10 INJECTION, SOLUTION INTRAMUSCULAR; INTRAVENOUS ONCE AS NEEDED
Status: DISCONTINUED | OUTPATIENT
Start: 2023-10-07 | End: 2023-10-08 | Stop reason: HOSPADM

## 2023-10-07 RX ORDER — OXYTOCIN/0.9 % SODIUM CHLORIDE 30/500 ML
60 PLASTIC BAG, INJECTION (ML) INTRAVENOUS
Status: DISCONTINUED | OUTPATIENT
Start: 2023-10-07 | End: 2023-10-08 | Stop reason: HOSPADM

## 2023-10-07 RX ADMIN — ACETAMINOPHEN 975 MG: 325 TABLET ORAL at 00:56

## 2023-10-07 RX ADMIN — ACETAMINOPHEN 975 MG: 325 TABLET ORAL at 17:05

## 2023-10-07 RX ADMIN — IBUPROFEN 600 MG: 600 TABLET, FILM COATED ORAL at 17:05

## 2023-10-07 RX ADMIN — IBUPROFEN 600 MG: 600 TABLET, FILM COATED ORAL at 00:55

## 2023-10-07 ASSESSMENT — PAIN SCALES - GENERAL
PAINLEVEL_OUTOF10: 2
PAINLEVEL_OUTOF10: 2
PAINLEVEL_OUTOF10: 0 - NO PAIN
PAINLEVEL_OUTOF10: 1
PAINLEVEL_OUTOF10: 0 - NO PAIN

## 2023-10-07 ASSESSMENT — PAIN DESCRIPTION - DESCRIPTORS: DESCRIPTORS: CRAMPING

## 2023-10-07 NOTE — PROGRESS NOTES
Postpartum Progress Note    Assessment/Plan   Georgia Angel is a 28 y.o., , who delivered at 39w3d gestation and is now postpartum day 1.    s/p    -Pain well controlled with PO pain meds  -Afebrile, ambulating   -Tolerating regular diet with anti-emetics PRN and bowel regimen ordered  -Voiding spontaneously   -Admission hgb 13.3 --> EBL 280cc; Continue to monitor for si/sx of anemia.   -Rh positive, rubella immune  -Lactation consultant PRN    DVT prophylaxis  -No indication for pharmacologic PPx  -SCDs, ambulation    Dispo: anticipate discharge on PPD#2 if continues to meet milestones. Plan for follow up in 4-6 weeks for postpartum visit with OB provider.      Principal Problem:    Normal labor  Active Problems:    Term pregnancy    Vaginal delivery    Pregnancy Problems (from 23 to present)       Problem Noted Resolved    Term pregnancy 10/6/2023 by Juan Luis Davis MD No    Priority:  Medium      39 weeks gestation of pregnancy 10/4/2023 by Erendira Gilmore MD No    Priority:  Medium      Encounter for supervision of normal first pregnancy in third trimester 10/4/2023 by Erendira Gilmore MD 10/6/2023 by Terese PARR MD          Hospital course: no complications    Subjective   Her pain is well controlled with current medications  She is passing flatus  She is ambulating well  She is tolerating a Adult diet Regular  She reports no breast or nursing problems  She denies emotional concerns today   Her plan for contraception is lactational amenorrhea    Objective   Allergies:   Animal dander, Milk containing products (dairy), and Penicillins         Last Vitals:  Temp Pulse Resp BP MAP Pulse Ox   36.8 °C (98.2 °F) 74 16 109/70   99 %     Vitals Min/Max Last 24 Hours:  Temp  Min: 36.7 °C (98.1 °F)  Max: 37.2 °C (98.9 °F)  Pulse  Min: 71  Max: 136  Resp  Min: 16  Max: 16  BP  Min: 97/55  Max: 130/65    Intake/Output:     Intake/Output Summary (Last 24 hours) at 10/7/2023 3767  Last data filed  at 10/6/2023 2100  Gross per 24 hour   Intake --   Output 1080 ml   Net -1080 ml       Physical Exam:  Gen: awake, alert  Head: NCAT  HEENT: moist mucus membranes  Pulm: breathing comfortably on room air  CV: warm and well-perfused  Abd: soft, non-tender, fundus firm below umbilicus  Neuro: alert and oriented  Psych: appropriate affect     Lab Data:  Labs in chart were reviewed.

## 2023-10-08 VITALS
HEART RATE: 80 BPM | WEIGHT: 174.38 LBS | BODY MASS INDEX: 29.05 KG/M2 | DIASTOLIC BLOOD PRESSURE: 81 MMHG | OXYGEN SATURATION: 99 % | RESPIRATION RATE: 16 BRPM | TEMPERATURE: 98.2 F | HEIGHT: 65 IN | SYSTOLIC BLOOD PRESSURE: 121 MMHG

## 2023-10-08 PROBLEM — Z34.90 TERM PREGNANCY (HHS-HCC): Status: RESOLVED | Noted: 2023-10-06 | Resolved: 2023-10-08

## 2023-10-08 PROBLEM — Z37.9 NORMAL LABOR (HHS-HCC): Status: RESOLVED | Noted: 2023-10-06 | Resolved: 2023-10-08

## 2023-10-08 PROCEDURE — 2500000001 HC RX 250 WO HCPCS SELF ADMINISTERED DRUGS (ALT 637 FOR MEDICARE OP): Performed by: OBSTETRICS & GYNECOLOGY

## 2023-10-08 RX ORDER — IBUPROFEN 600 MG/1
600 TABLET ORAL EVERY 6 HOURS SCHEDULED
Start: 2023-10-08

## 2023-10-08 RX ORDER — ACETAMINOPHEN 325 MG/1
975 TABLET ORAL EVERY 6 HOURS SCHEDULED
Start: 2023-10-08

## 2023-10-08 RX ADMIN — IBUPROFEN 600 MG: 600 TABLET, FILM COATED ORAL at 07:07

## 2023-10-08 RX ADMIN — ACETAMINOPHEN 975 MG: 325 TABLET ORAL at 07:06

## 2023-10-08 ASSESSMENT — PAIN SCALES - GENERAL
PAINLEVEL_OUTOF10: 1
PAINLEVEL_OUTOF10: 0 - NO PAIN
PAINLEVEL_OUTOF10: 0 - NO PAIN

## 2023-10-08 ASSESSMENT — PAIN DESCRIPTION - DESCRIPTORS: DESCRIPTORS: CRAMPING

## 2023-10-08 NOTE — CARE PLAN
Problem: Postpartum  Goal: Experiences normal postpartum course  Outcome: Progressing  Goal: Appropriate maternal -  bonding  Outcome: Progressing  Goal: Establish and maintain infant feeding pattern for adequate nutrition  Outcome: Progressing  Goal: Incisions, wounds, or drain sites healing without S/S of infection  Outcome: Progressing  Goal: No s/sx infection  Outcome: Progressing  Goal: No s/sx of hemorrhage  Outcome: Progressing  Goal: Minimal s/sx of HDP and BP<160/110  Outcome: Progressing

## 2023-10-08 NOTE — NURSING NOTE
Discharge instructions reviewed with pt for self and . Med req also revewed, pt verbalized understabding

## 2023-10-08 NOTE — PROGRESS NOTES
Postpartum Progress Note    Assessment/Plan   Georgia Angel is a 28 y.o., , who delivered at 39w3d gestation and is now postpartum day 2.    Pain well controlled  Ambulating well  Wants DC home today  F/U in 6wks  Went over DC home precautions     Principal Problem:    Normal labor  Active Problems:    Term pregnancy    Vaginal delivery    Pregnancy Problems (from 23 to present)       Problem Noted Resolved    Term pregnancy 10/6/2023 by Juan Luis Davis MD No    Priority:  Medium      39 weeks gestation of pregnancy 10/4/2023 by Erendira Gilmore MD No    Priority:  Medium      Encounter for supervision of normal first pregnancy in third trimester 10/4/2023 by Erendira Gilmore MD 10/6/2023 by Terese PARR MD          Hospital course: no complications  Vaginal Birth  Patient is currently breastfeedingThe patient's blood type is A POS. Rhogam is not indicated.    Subjective   Her pain is well controlled with current medications  She is passing flatus  She is ambulating well  She is tolerating a Adult diet Regular  She reports no breast or nursing problems  She denies emotional concerns today   Her plan for contraception is none     Wants DC home today.    Objective   Allergies:   Animal dander, Milk containing products (dairy), and Penicillins         Last Vitals:  Temp Pulse Resp BP MAP Pulse Ox   36.8 °C (98.2 °F) 80 16 121/81   99 %     Vitals Min/Max Last 24 Hours:  Temp  Min: 36.8 °C (98.2 °F)  Max: 36.8 °C (98.2 °F)  Pulse  Min: 80  Max: 95  Resp  Min: 16  Max: 16  BP  Min: 102/61  Max: 121/81    Intake/Output:   No intake or output data in the 24 hours ending 10/08/23 1059    Physical Exam:  General: Examination reveals a well developed, well nourished, female, in no acute distress. She is alert and cooperative.  Abdomen: non-tender, firm.  Fundus: firm.  Extremities: no redness or tenderness in the calves or thighs, no edema.    Lab Data:  Labs in chart were reviewed.

## 2023-10-08 NOTE — DISCHARGE SUMMARY
Discharge Summary    Admission Date: 10/6/2023  Discharge Date: 10/8/23    Discharge Diagnosis  Normal labor    Hospital Course  Delivery Date: 10/6/2023  11:25 AM   Delivery type: Vaginal, Spontaneous    GA at delivery: 39w3d  Outcome: Living   Anesthesia during delivery: None   Intrapartum complications: None   Feeding method: Breastfeeding Status: Yes     Procedures: none  Contraception at discharge: none      Pertinent Physical Exam At Time of Discharge    General: Examination reveals a well developed, well nourished, female, in no acute distress. She is alert and cooperative.  Abdomen: soft, gravid, nontender, nondistended, no abnormal masses, no epigastric pain.  Fundus: firm.    Discharge Meds     Your medication list        START taking these medications        Instructions Last Dose Given Next Dose Due   acetaminophen 325 mg tablet  Commonly known as: Tylenol      Take 3 tablets (975 mg) by mouth every 6 hours.       ibuprofen 600 mg tablet      Take 1 tablet (600 mg) by mouth every 6 hours.              CONTINUE taking these medications        Instructions Last Dose Given Next Dose Due   PRENATAL GUMMIES ORAL                  STOP taking these medications      ferrous sulfate 325 (65 Fe) MG tablet                  Where to Get Your Medications        Information about where to get these medications is not yet available    Ask your nurse or doctor about these medications  acetaminophen 325 mg tablet  ibuprofen 600 mg tablet          Complications Requiring Follow-Up  none    Test Results Pending At Discharge  Pending Labs       No current pending labs.            Outpatient Follow-Up  Future Appointments   Date Time Provider Department Center   10/10/2023  9:10 AM MD JONAH Giraldo [place]   10/17/2023  9:10 AM MD JONAH Giraldo [place]       I spent 30 minutes in the professional and overall care of this patient.      Erin Scott DO

## 2023-10-09 ENCOUNTER — TELEPHONE (OUTPATIENT)
Dept: LACTATION | Facility: CLINIC | Age: 28
End: 2023-10-09

## 2023-10-09 ENCOUNTER — LACTATION CONSULT (OUTPATIENT)
Dept: LACTATION | Facility: CLINIC | Age: 28
End: 2023-10-09
Payer: COMMERCIAL

## 2023-10-09 DIAGNOSIS — O92.70 DISORDER OF LACTATION (HHS-HCC): Primary | ICD-10-CM

## 2023-10-09 PROCEDURE — 99211 OFF/OP EST MAY X REQ PHY/QHP: CPT | Performed by: OBSTETRICS & GYNECOLOGY

## 2023-10-09 ASSESSMENT — ENCOUNTER SYMPTOMS
OCCASIONAL FEELINGS OF UNSTEADINESS: 0
LOSS OF SENSATION IN FEET: 0
DEPRESSION: 0

## 2023-10-09 NOTE — PATIENT INSTRUCTIONS
Problems latching baby to breast: Baby should latch to areola (dark area) not just the nipple.  Baby's lips should be flanged outward.  Bring the baby up to the level of your breast by putting a pillow under the baby.  Do not use bottles or pacifiers until latching on well.  Dribble milk over the nipple or express milk so that baby can taste it  Encourage a deeper latch with the asymetrical latch- lining baby's nose opposite mother's nipple.  Encourage nipple to stand up prior to feeing by pumping, nipple rolling or by brief application of ice.  Hold baby so that your breast is positioned deep in the baby's mouth.  Hold your baby close, tummy to tummy.  If baby does not latch to breast, express breast milk.  If engorged, express some milk to soften breast.  If the baby is not latched on well, break seal and gently try again.  Keep baby skin to skin and watch for feeding cues.  Massage breast to start milk-ejection reflex.  Place nipple and at least 1 inch of areola into baby's mouth.  Place your hand behind the baby's neck and shoulder.  Do not force baby's head into breast.  Put baby in the football hold or clutch hold, supporting his/her neck and head in sniffing position to open his/her throat.  Shape breast into oval shape (sandwich hold)  for deep latch.  Try different feeding positions (cradle, football, side etc.).  Use a breast feeding pillow to bring baby up to the level of the breast.  Use nipple shield and monitor baby's weight gain and output.  Use semi-reclined feeding position to allow baby to take an active role and trigger baby's inborn feeding behavior.  Use your hand to support your breast during feeding.  When your baby's mouth is wide open, quickly pull baby into your breast.  Your baby's chin should be pressed into your breast.

## 2023-10-09 NOTE — PROGRESS NOTES
Lactation Counseling Note    Subjective:    Georgia Angel is a 28 y.o. patient referred for lactation counseling. She is here today due to Latch issues. She was referred by her Pediatrician Dr angel .     OB HISTORY:   /Para: : 1  Para: 1  Weeks Gestation: 39 weeks  Vaginal delivery no complications for mom or baby    Objective:    BREASTFEEDING ASSESSMENT:   Feeding and Cleaning instructions reviewed for: nipple shield cleaning reviewed  Weight: Reported pediatrician visit weight: 6 lb 12 oz  Date of pediatrician weight: 10/9/23  Weight before feedin gm  Weight after feedin gm  Amount of breast milk transferred: 52 ml  Number of voids in the last 24 hours: 8  Number of stools in the last 24 hours: 5    PATIENT DISCUSSION SUMMARY:    LACTATION PROBLEMS AND STRATEGIES:  Problems latching baby to breast: Baby should latch to areola (dark area) not just the nipple.  Baby's lips should be flanged outward.  Bring the baby up to the level of your breast by putting a pillow under the baby.  Do not use bottles or pacifiers until latching on well.  Dribble milk over the nipple or express milk so that baby can taste it  Encourage a deeper latch with the asymetrical latch- lining baby's nose opposite mother's nipple.  Encourage nipple to stand up prior to feeing by pumping, nipple rolling or by brief application of ice.  Hold baby so that your breast is positioned deep in the baby's mouth.  Hold your baby close, tummy to tummy.  If baby does not latch to breast, express breast milk.  If engorged, express some milk to soften breast.  If the baby is not latched on well, break seal and gently try again.  Keep baby skin to skin and watch for feeding cues.  Massage breast to start milk-ejection reflex.  Place nipple and at least 1 inch of areola into baby's mouth.  Place your hand behind the baby's neck and shoulder.  Do not force baby's head into breast.  Put baby in the football hold or clutch  hold, supporting his/her neck and head in sniffing position to open his/her throat.  Shape breast into oval shape (sandwich hold)  for deep latch.  Try different feeding positions (cradle, football, side etc.).  Use a breast feeding pillow to bring baby up to the level of the breast.  Use nipple shield and monitor baby's weight gain and output.  Use semi-reclined feeding position to allow baby to take an active role and trigger baby's inborn feeding behavior.  Use your hand to support your breast during feeding.  When your baby's mouth is wide open, quickly pull baby into your breast.  Your baby's chin should be pressed into your breast.  PATIENT INSTRUCTION HANDOUTS GIVEN:   Foods that promote good milk production  LACTATION EDUCATION:  Waking Techniques    ABCs of safe sleep reviewed  Sleeps in bassinet in mom's room

## 2023-10-09 NOTE — TELEPHONE ENCOUNTER
Pt called and wants to know if it's normal for her  incision to be more painful/stinging today then when she had the  this past Friday.  Pls advise.

## 2023-10-09 NOTE — TELEPHONE ENCOUNTER
Spoke with pt, states she has a 1st degree vaginal tear during delviery, she squatted down the other da and felt some pulling on the area. Also states that she is having a stingy sensation. Advised to not squat and to take it east for the next 2 weeks since this pulling sensation is from her sutures. The same goes for the stinging, advised that it is an open wound and it is healing. Instructed to continue use with Malgorzata bottle and witch hazel as well as ice pack to the area. Instructed to call back with any s/sx's of vaginal infection or with any other concerns. Pt verbalized understanding.

## 2023-10-10 ENCOUNTER — APPOINTMENT (OUTPATIENT)
Dept: OBSTETRICS AND GYNECOLOGY | Facility: CLINIC | Age: 28
End: 2023-10-10
Payer: COMMERCIAL

## 2023-10-11 ENCOUNTER — LACTATION CONSULT (OUTPATIENT)
Dept: LACTATION | Facility: CLINIC | Age: 28
End: 2023-10-11
Payer: COMMERCIAL

## 2023-10-11 ENCOUNTER — TELEPHONE (OUTPATIENT)
Dept: OBSTETRICS AND GYNECOLOGY | Facility: CLINIC | Age: 28
End: 2023-10-11

## 2023-10-11 DIAGNOSIS — O92.70 DISORDER OF LACTATION (HHS-HCC): Primary | ICD-10-CM

## 2023-10-11 PROCEDURE — 99211 OFF/OP EST MAY X REQ PHY/QHP: CPT | Performed by: OBSTETRICS & GYNECOLOGY

## 2023-10-11 ASSESSMENT — PATIENT HEALTH QUESTIONNAIRE - PHQ9
1. LITTLE INTEREST OR PLEASURE IN DOING THINGS: NOT AT ALL
SUM OF ALL RESPONSES TO PHQ9 QUESTIONS 1 & 2: 0
2. FEELING DOWN, DEPRESSED OR HOPELESS: NOT AT ALL

## 2023-10-11 NOTE — PROGRESS NOTES
Lactation Counseling Note    Subjective:    Georgia Angel is a 28 y.o. patient referred for lactation counseling. She is here today due to Latch issues. She was referred by her Pediatrician Dr Ascencio .     Baby sleeps in bassinet in Mother's room.    ABCs of safe sleep reviewed.  Pt. Denies post partum warning signs      BREASTFEEDING ASSESSMENT:   Nipple Pain: Pain scale 0-10 (10 most pain): 5  Pain description: burning   Weight: Weight before feeding: #7-0 or 3170 gm  Weight after feeding: #7-1.9 or 3232 gm  Amount of breast milk transferred: 62 mls ml  Number of voids in the last 24 hours: 6  Number of stools in the last 24 hours: 6    PATIENT DISCUSSION SUMMARY:    LACTATION PROBLEMS AND STRATEGIES:  Sore nipple (any stage): Air dry nipples between feedings.  Check bra for tightness or rough seams.  Discontinue use of creams and ointments.  Discontinue use of soaps or irritating substances.  Express milk before feeding to encourage rapid letdown.  Gave PI Sheet (PI-167) on Sore and Cracked Nipples.  If nipples have eczema, rinse food particles from baby's mouth before nursing.  Review latch on and positioning.  See Sore Nipples/Early for more information.  See physician for unusual rashes or sores on nipples.  Use relaxation and breathing techniques.  Vary nursing positions.  Wash nipple wounds with soapy warm water once per day.  PATIENT INSTRUCTION HANDOUTS GIVEN:   Support group flyer  LACTATION EDUCATION:  Waking Techniques, Correct Positioning, Correct Latch On, and Demo use of Pump    Ask Dr Gilmore for all Purpose nipple Cream

## 2023-10-11 NOTE — PATIENT INSTRUCTIONS
"Sore nipple (any stage): {Lactation sore nipple any stage:60281::\"Air dry nipples between feedings.\",\"Check bra for tightness or rough seams.\",\"Discontinue use of creams and ointments.\",\"Discontinue use of soaps or irritating substances.\",\"Express milk before feeding to encourage rapid letdown.\",\"Gave PI Sheet (PI-167) on Sore and Cracked Nipples.\",\"If nipples have eczema, rinse food particles from baby's mouth before nursing.\",\"Review latch on and positioning.\",\"See Sore Nipples/Early for more information.\",\"See physician for unusual rashes or sores on nipples.\",\"Use relaxation and breathing techniques.\",\"Vary nursing positions.\",\"Wash nipple wounds with soapy warm water once per day.     PATIENT INSTRUCTION HANDOUTS GIVEN:   Support group flyer  LACTATION EDUCATION:  Waking Techniques, Correct Positioning, Correct Latch On, and Demo use of Pump     Ask Dr Gilmore for all Purpose nipple Cream  "

## 2023-10-11 NOTE — TELEPHONE ENCOUNTER
"Dr Gilmore's pt gave birth last week, went to lactation counseling and they told her to get nipple cream. Georgia would like a Rx for this. She also has a question about numbness in her hands, please return her call. Thanks        I called pt back and spoke with her directly. Pt saw lactation toda 10/11 and was instructed to call to ask Dr Gilmore for an \"all purpose nipple cream\" (see note in chart from lactation). I advised that I would have to send a message to [facility] regarding this, pt does have a cream she is using in the meantime to help with healing. Pt also stated that when she got her IV placed on L&D it really hurt. The nurse that inserted it told her that it was normal. However, after hours have passed, her fingers started to go numb. Now, 6 days postpatum, she still has numbness in her finger that will not go away. Has tried putting heat to it and massaging it. Was wondering if this is normal and/or is there anything she can do to help with this sensation to relieve it. Please advise.  - Ana Paula Nieves MA II  "

## 2023-10-12 NOTE — TELEPHONE ENCOUNTER
Per Dr Gilmore, ok to prescribe Neumans Nipple ointment and that the numbness in her fingers will get better over time. Pt has bene notified of this information. Neumans nipple ointment has been verbally called in to [facility] in [place]. Pt has pharmacy info. Will call back with any other issues concerns or questions.

## 2023-10-16 ENCOUNTER — TELEPHONE (OUTPATIENT)
Dept: LACTATION | Facility: CLINIC | Age: 28
End: 2023-10-16

## 2023-10-17 ENCOUNTER — APPOINTMENT (OUTPATIENT)
Dept: OBSTETRICS AND GYNECOLOGY | Facility: CLINIC | Age: 28
End: 2023-10-17
Payer: COMMERCIAL

## 2023-10-30 ENCOUNTER — TELEPHONE (OUTPATIENT)
Dept: OBSTETRICS AND GYNECOLOGY | Facility: CLINIC | Age: 28
End: 2023-10-30

## 2023-10-30 NOTE — TELEPHONE ENCOUNTER
"3 week post partum patient called OB line c/o increased vaginal pressure the last few days making if very difficult to walk \"Feels heavy\".  Patient denies issues with bowel movements or urinating but when she does go either, there is a lot of pressure.  Patient is concerned that something is wrong and isn't sure if she should wait until her 6 week post partum visit on 11/17 or come in sooner?  Patient advised that its possible that she is still healing after her vaginal delivery but she was assured that I will send a message to Dr Gilmore for advise.  Please advise  "

## 2023-11-01 ENCOUNTER — POSTPARTUM VISIT (OUTPATIENT)
Dept: OBSTETRICS AND GYNECOLOGY | Facility: CLINIC | Age: 28
End: 2023-11-01
Payer: COMMERCIAL

## 2023-11-01 VITALS
DIASTOLIC BLOOD PRESSURE: 82 MMHG | SYSTOLIC BLOOD PRESSURE: 110 MMHG | BODY MASS INDEX: 26.15 KG/M2 | WEIGHT: 157.13 LBS

## 2023-11-01 DIAGNOSIS — Z91.89: ICD-10-CM

## 2023-11-01 PROCEDURE — 99213 OFFICE O/P EST LOW 20 MIN: CPT

## 2023-11-01 ASSESSMENT — ENCOUNTER SYMPTOMS
GASTROINTESTINAL NEGATIVE: 0
HEMATOLOGIC/LYMPHATIC NEGATIVE: 0
PSYCHIATRIC NEGATIVE: 0
CONSTITUTIONAL NEGATIVE: 0
CARDIOVASCULAR NEGATIVE: 0
NEUROLOGICAL NEGATIVE: 0
ALLERGIC/IMMUNOLOGIC NEGATIVE: 0
EYES NEGATIVE: 0
MUSCULOSKELETAL NEGATIVE: 0
RESPIRATORY NEGATIVE: 0
ENDOCRINE NEGATIVE: 0

## 2023-11-01 ASSESSMENT — EDINBURGH POSTNATAL DEPRESSION SCALE (EPDS)
I HAVE BEEN ANXIOUS OR WORRIED FOR NO GOOD REASON: HARDLY EVER
THINGS HAVE BEEN GETTING ON TOP OF ME: NO, MOST OF THE TIME I HAVE COPED QUITE WELL
I HAVE LOOKED FORWARD WITH ENJOYMENT TO THINGS: AS MUCH AS I EVER DID
I HAVE FELT SCARED OR PANICKY FOR NO GOOD REASON: NO, NOT MUCH
I HAVE FELT SAD OR MISERABLE: NO, NOT AT ALL
THE THOUGHT OF HARMING MYSELF HAS OCCURRED TO ME: NEVER
I HAVE BEEN ABLE TO LAUGH AND SEE THE FUNNY SIDE OF THINGS: AS MUCH AS I ALWAYS COULD
I HAVE BEEN SO UNHAPPY THAT I HAVE BEEN CRYING: NO, NEVER
I HAVE BLAMED MYSELF UNNECESSARILY WHEN THINGS WENT WRONG: NOT VERY OFTEN
I HAVE BEEN SO UNHAPPY THAT I HAVE HAD DIFFICULTY SLEEPING: NOT AT ALL
TOTAL SCORE: 4

## 2023-11-01 NOTE — PROGRESS NOTES
"Subjective   28 y.o.  presenting for postpartum follow-up; concern for vaginal pressure and pain.    Delivery Date: 10/6/2023  Type of Delivery: Vaginal, Spontaneous      Pregnancy Problems (from 23 to present)       Problem Noted Resolved    39 weeks gestation of pregnancy 10/4/2023 by Erendira Gilmore MD No    Term pregnancy 10/6/2023 by Juan Luis Davis MD 10/8/2023 by Erin Scott DO    Encounter for supervision of normal first pregnancy in third trimester 10/4/2023 by Erendira Gilmore MD 10/6/2023 by Terese PARR MD          Concerns: Patient states that she is noticing increased vaginal pressure, \"like I am sitting on something\" and soreness.  Continues to have PP lochia, going through 4 regular-sized pads/day per patient.  Bleeding is still red per patient, though decreasing.  Some days heavier than others, with occasional dime-sized clots noted on her pad.  Lacerations: 1st degree  Has not tried anything to help with her discomfort.  States that she has been walking \"a couple of miles\" daily.     Feeding Method: She is breast feeding exclusively.   Bonding with Baby: well with baby boy, [unfilled]   Mood:   Postpartum Depression: Low Risk  (2023)    [place]  Depression Scale     Last EPDS Total Score: 4     Last EPDS Self Harm Result: Never       Physical Exam  Constitutional:       Appearance: Normal appearance.   HENT:      Head: Normocephalic.   Pulmonary:      Effort: Pulmonary effort is normal.   Abdominal:      General: Abdomen is flat.      Palpations: Abdomen is soft.   Genitourinary:     General: Normal vulva.      Vagina: Bleeding present.      Cervix: Normal.      Uterus: Normal.       Comments: Stitches dissolved.  No evidence of PP hematoma.  Skin:     General: Skin is warm and dry.   Neurological:      Mental Status: She is alert and oriented to person, place, and time.   Psychiatric:         Mood and Affect: Mood normal.        Assessment/Plan   28 y.o "  for vaginal discomfort, approx. 4 weeks PP --    No evidence of vaginal hematoma on exam today; stitches dissolved, healing well and repair is well-approximated.  Discussed/encouraged sitz baths, ice packs, and Tylenol/Motrin.  Discussed decreasing physical activity until symptoms begin to lessen.  Warning signs and symptoms reviewed, when to call emergency answering service (ie increased bleeding, large blood clots, dizziness, fever, worsening pain, etc.).  Patient reports understanding, all questions answered.    F/U for 6 week PPV, or as needed.    Krista Hernandez, APRN-CNM

## 2023-11-17 ENCOUNTER — POSTPARTUM VISIT (OUTPATIENT)
Dept: OBSTETRICS AND GYNECOLOGY | Facility: CLINIC | Age: 28
End: 2023-11-17
Payer: COMMERCIAL

## 2023-11-17 VITALS — SYSTOLIC BLOOD PRESSURE: 112 MMHG | WEIGHT: 156.5 LBS | BODY MASS INDEX: 26.04 KG/M2 | DIASTOLIC BLOOD PRESSURE: 78 MMHG

## 2023-11-17 PROCEDURE — 88305 TISSUE EXAM BY PATHOLOGIST: CPT

## 2023-11-17 PROCEDURE — 0503F POSTPARTUM CARE VISIT: CPT | Performed by: OBSTETRICS & GYNECOLOGY

## 2023-11-17 PROCEDURE — 88142 CYTOPATH C/V THIN LAYER: CPT

## 2023-11-17 ASSESSMENT — EDINBURGH POSTNATAL DEPRESSION SCALE (EPDS)
I HAVE LOOKED FORWARD WITH ENJOYMENT TO THINGS: AS MUCH AS I EVER DID
I HAVE FELT SCARED OR PANICKY FOR NO GOOD REASON: YES, SOMETIMES
I HAVE BEEN ABLE TO LAUGH AND SEE THE FUNNY SIDE OF THINGS: AS MUCH AS I ALWAYS COULD
I HAVE BEEN SO UNHAPPY THAT I HAVE BEEN CRYING: NO, NEVER
TOTAL SCORE: 8
I HAVE BEEN SO UNHAPPY THAT I HAVE HAD DIFFICULTY SLEEPING: NOT AT ALL
THE THOUGHT OF HARMING MYSELF HAS OCCURRED TO ME: NEVER
I HAVE BEEN ANXIOUS OR WORRIED FOR NO GOOD REASON: YES, SOMETIMES
I HAVE FELT SAD OR MISERABLE: NO, NOT AT ALL
THINGS HAVE BEEN GETTING ON TOP OF ME: YES, SOMETIMES I HAVEN'T BEEN COPING AS WELL AS USUAL
I HAVE BLAMED MYSELF UNNECESSARILY WHEN THINGS WENT WRONG: YES, SOME OF THE TIME

## 2023-11-17 NOTE — PROGRESS NOTES
Postpartum Visit    HPI:  Pt presents for her 6 week postpartum visit s/p  on 10/6/23 without complication. .  She had a baby boy.  She is Breast feeding, no concerns today.  EPDS is 8, feels she is doing well.  She has been having period like bleeding still, changing a maxi pad every 3-4 hours.  Declines birth control.     ROS:  Denies the following: Complains of the following:    - episiotomy site pain   - incisional pain  - incisional drainage  - heavy bleeding  - irregular bleeding  - breast pain  - cracked nipples  - breastfeeding problems  - abdominal pain  - vaginal discharge  - shortness of breath  - chest pain  - back pain  - leg pain  - depression  - suicidal ideation  - nausea  - vomiting  - fever  - pain with urination  - tiredness or fatigue  - headache no pertinent positives        O:  /78   Wt 71 kg (156 lb 8 oz)   LMP 2023   Breastfeeding Unknown   BMI 26.04 kg/m²     General Appearance   - consistent with stated age, well groomed and cooperative    Integumentary  - skin warm and dry without rash    Head and Neck  - normalocephalic and neck supple    Chest and Lung Exam  - normal breathing effort, no respiratory distress    Female Genitourinary  - vulva normal without rash or lesion, normal vaginal rugae, no vaginal discharge, perineum well healed, uterus normal size & no palpable masses, 3x2 cm piece of retained placenta coming through cervix on speculum exam, removed with ring forceps    TVUS: no clear e/o further rPOC    A/P:   Pt is a 28 y.o.  who presents for 6 week postpartum visit.  Doing well overall postpartum.  Coping well with new baby at home.  [place]  Depression Scale Total: 8.  Breastfeeding going well.  . Baby doing well.  Exam significant for removal of retained placenta at cervix today, my informal TVUS does not show clear e/o more rPOC.  Will check formal MFM scan to confirm, this was scheduled for her today prior to leaving office.  Bleeding  precautions discussed extensively.  Contraception discussed - declines. Plans to use withdrawal/condoms. Pap done today as patient due, potential it is insufficient for blood as done just after removal of rPOC.  Will follow up after formal scan for further plan.    Erendira Gilmore MD

## 2023-11-24 LAB
LABORATORY COMMENT REPORT: NORMAL
PATH REPORT.FINAL DX SPEC: NORMAL
PATH REPORT.GROSS SPEC: NORMAL
PATH REPORT.RELEVANT HX SPEC: NORMAL
PATH REPORT.TOTAL CANCER: NORMAL

## 2023-11-30 ENCOUNTER — TELEPHONE (OUTPATIENT)
Dept: OBSTETRICS AND GYNECOLOGY | Facility: CLINIC | Age: 28
End: 2023-11-30

## 2023-11-30 ENCOUNTER — ANCILLARY PROCEDURE (OUTPATIENT)
Dept: RADIOLOGY | Facility: CLINIC | Age: 28
End: 2023-11-30
Payer: COMMERCIAL

## 2023-11-30 DIAGNOSIS — Z34.90 ENCOUNTER FOR SUPERVISION OF NORMAL PREGNANCY, UNSPECIFIED, UNSPECIFIED TRIMESTER (HHS-HCC): ICD-10-CM

## 2023-11-30 PROCEDURE — 76830 TRANSVAGINAL US NON-OB: CPT

## 2023-11-30 PROCEDURE — 76830 TRANSVAGINAL US NON-OB: CPT | Performed by: STUDENT IN AN ORGANIZED HEALTH CARE EDUCATION/TRAINING PROGRAM

## 2023-11-30 PROCEDURE — 76857 US EXAM PELVIC LIMITED: CPT

## 2023-11-30 PROCEDURE — 76856 US EXAM PELVIC COMPLETE: CPT | Performed by: STUDENT IN AN ORGANIZED HEALTH CARE EDUCATION/TRAINING PROGRAM

## 2023-11-30 NOTE — TELEPHONE ENCOUNTER
Patient left message on OB line stating that she had her pelvic ultrasound today to check for retained POC's and was told there was no evidencd of retained products of conception.  Patient would like to know if she can resume normal activity and intercourse?  Please review and advise

## 2023-12-15 LAB
CYTOLOGY CMNT CVX/VAG CYTO-IMP: NORMAL
LAB AP HPV GENOTYPE QUESTION: NO
LAB AP HPV HR: NORMAL
LABORATORY COMMENT REPORT: NORMAL
LABORATORY COMMENT REPORT: NORMAL
MENSTRUAL HX REPORTED: NORMAL
PATH REPORT.TOTAL CANCER: NORMAL

## 2024-04-29 ENCOUNTER — TELEPHONE (OUTPATIENT)
Dept: OBSTETRICS AND GYNECOLOGY | Facility: CLINIC | Age: 29
End: 2024-04-29
Payer: COMMERCIAL

## 2024-04-29 NOTE — TELEPHONE ENCOUNTER
Patient called the OB line stating she is still breastfeeding her son and was recently prescribed Clindamycin from a dental procedure she had one, wanted to know if this was safe to use while nursing. Consulted with Dr Gilmore, advised that yes, it is safe to take while nursing. Pt notified , no further questions.

## 2024-08-08 ENCOUNTER — APPOINTMENT (OUTPATIENT)
Dept: OBSTETRICS AND GYNECOLOGY | Facility: CLINIC | Age: 29
End: 2024-08-08
Payer: COMMERCIAL

## 2024-09-18 ENCOUNTER — TELEPHONE (OUTPATIENT)
Dept: OBSTETRICS AND GYNECOLOGY | Facility: CLINIC | Age: 29
End: 2024-09-18
Payer: COMMERCIAL

## 2024-09-18 NOTE — TELEPHONE ENCOUNTER
Patient called OB line stating that she was travelling over the weekend for vacation and after her right calf was a little sore. She was informed to watch out for any hot, or red areas on her leg. If the pain increases, patient will go to an urgent care near her.

## 2024-09-27 ENCOUNTER — TELEPHONE (OUTPATIENT)
Dept: OBSTETRICS AND GYNECOLOGY | Facility: CLINIC | Age: 29
End: 2024-09-27

## 2024-09-27 ENCOUNTER — HOSPITAL ENCOUNTER (OUTPATIENT)
Dept: RADIOLOGY | Facility: CLINIC | Age: 29
Discharge: HOME | End: 2024-09-27
Payer: COMMERCIAL

## 2024-09-27 DIAGNOSIS — O36.80X0 PREGNANCY WITH UNCERTAIN FETAL VIABILITY, SINGLE OR UNSPECIFIED FETUS (HHS-HCC): ICD-10-CM

## 2024-09-27 DIAGNOSIS — O03.9 MISCARRIAGE (HHS-HCC): Primary | ICD-10-CM

## 2024-09-27 PROCEDURE — 76801 OB US < 14 WKS SINGLE FETUS: CPT

## 2024-09-27 PROCEDURE — 76817 TRANSVAGINAL US OBSTETRIC: CPT

## 2024-09-27 NOTE — TELEPHONE ENCOUNTER
Patient left message on ob line stating that she is almost certain that she just miscarried.  She just a large clot with what looks like tissue.      Patient has ultrasound with [facility] this afternoon.    Left message for patient to call back.  Message sent to Dr Gilmore to make her aware

## 2024-09-27 NOTE — TELEPHONE ENCOUNTER
Patient called ob line earlier this morning c/o spotting/ light bleeding after intercourse yesterday.  We reviewed bleeding precautions and discussed that spotting or light bleeding after intercourse is not uncommon in pregnancy.  She was advised to monitor and to call back if she starts saturating a pad every 1-2 hours.    Following [employer] message sent to patient as well:    Nilo Urbinale,     As we discussed, spotting/light bleeding and even mild cramping in the first part of pregnancy is NOT uncommon and does not necessarily mean anything bad is happening.  Spotting can happened for various reasons, such as recent intercourse or could be due to implantation.     In your case, your bright red light bleeding is most likely due to having recent intercourse yesterday.  And the clots that you passed this morning, after getting up, is due to your light bleeding collecting in your vagina then when standing gravity caused it to come out all at once.      Your cervix is very vascular in pregnancy, meaning anything that brushes against it can cause irritation, which can lead to spotting or event light bleeding.  This can happen during, directly following or a day or so following intercourse.  The spotting or light bleeding can last for a few hours to a few days.       You should monitor the spotting for now and call back if you start experiencing heavy bleeding where you need to change her pad every 1-2 hours with intense cramping or pain.       I will forward your message onto Dr Carreno for any further recommendations, otherwise keep your initial pregnancy visit as scheduled on 10/1.     Please reach back out with any other questions or concerns.     Warmly,  Teagan  OB Navigator    Patient called ob line back stating that she just called and spoke with on call physician, Dr Davis, due to her bleeding picking up to moreso like a period now.  Dr Davis recommended that patient call office back and see if we are able to get  her in for a formal scan today, if not patient advised to go to [facility] ED for evaluation.    Pre Dr Gilmore, referral signed for [facility].    Patient scheduled for 3:30 pm today at [facility].

## 2024-09-28 ENCOUNTER — LAB (OUTPATIENT)
Dept: LAB | Facility: LAB | Age: 29
End: 2024-09-28
Payer: COMMERCIAL

## 2024-09-28 DIAGNOSIS — O03.9 MISCARRIAGE (HHS-HCC): ICD-10-CM

## 2024-09-28 LAB — B-HCG SERPL-ACNC: 113 MIU/ML

## 2024-09-28 PROCEDURE — 36415 COLL VENOUS BLD VENIPUNCTURE: CPT

## 2024-09-28 PROCEDURE — 84702 CHORIONIC GONADOTROPIN TEST: CPT

## 2024-10-01 ENCOUNTER — APPOINTMENT (OUTPATIENT)
Dept: OBSTETRICS AND GYNECOLOGY | Facility: CLINIC | Age: 29
End: 2024-10-01
Payer: COMMERCIAL

## 2024-10-04 ENCOUNTER — APPOINTMENT (OUTPATIENT)
Dept: OBSTETRICS AND GYNECOLOGY | Facility: CLINIC | Age: 29
End: 2024-10-04
Payer: COMMERCIAL

## 2024-10-04 VITALS
DIASTOLIC BLOOD PRESSURE: 80 MMHG | WEIGHT: 144.2 LBS | BODY MASS INDEX: 24.03 KG/M2 | SYSTOLIC BLOOD PRESSURE: 123 MMHG | HEIGHT: 65 IN

## 2024-10-04 DIAGNOSIS — O03.9 MISCARRIAGE (HHS-HCC): ICD-10-CM

## 2024-10-04 LAB — B-HCG SERPL-ACNC: 9 MIU/ML

## 2024-10-04 PROCEDURE — 3008F BODY MASS INDEX DOCD: CPT | Performed by: OBSTETRICS & GYNECOLOGY

## 2024-10-04 PROCEDURE — 1036F TOBACCO NON-USER: CPT | Performed by: OBSTETRICS & GYNECOLOGY

## 2024-10-04 PROCEDURE — 84702 CHORIONIC GONADOTROPIN TEST: CPT

## 2024-10-04 PROCEDURE — 99213 OFFICE O/P EST LOW 20 MIN: CPT | Performed by: OBSTETRICS & GYNECOLOGY

## 2024-10-04 ASSESSMENT — ENCOUNTER SYMPTOMS
RESPIRATORY NEGATIVE: 1
CARDIOVASCULAR NEGATIVE: 1
GASTROINTESTINAL NEGATIVE: 1
CONSTITUTIONAL NEGATIVE: 1
EYES NEGATIVE: 1

## 2024-10-04 NOTE — PROGRESS NOTES
Subjective   Patient ID: Georgia Angel is a 29 y.o. female who presents for Miscarriage.    HPI  30 y/o  presenting for miscarriage follow up.  Passed tissue last , had a formal MFM just after that which showed a thin ES with no IUP/EUP.  Hcg  was 113, for redraw today.  Still having some bleeding but getting lighter.  Rh+    Review of Systems   Constitutional: Negative.    HENT: Negative.     Eyes: Negative.    Respiratory: Negative.     Cardiovascular: Negative.    Gastrointestinal: Negative.    Genitourinary:  Positive for vaginal bleeding.     Objective   Physical Exam  Gen in NAD    Assessment/Plan   30 y/o  presenting for miscarriage follow up.  Discussed cause was likely chromosomal in nature.  Formal scan hours after she passed tissue showed no rPOC.  Check hcg to insure downtrend today.  Rh+.  Okay to try again. Will call if still bleeding in another week or so.      Erendira Gilmore MD 10/04/24 12:53 PM

## 2024-10-07 ENCOUNTER — APPOINTMENT (OUTPATIENT)
Dept: OBSTETRICS AND GYNECOLOGY | Facility: CLINIC | Age: 29
End: 2024-10-07
Payer: COMMERCIAL

## 2024-11-22 ENCOUNTER — APPOINTMENT (OUTPATIENT)
Dept: OBSTETRICS AND GYNECOLOGY | Facility: CLINIC | Age: 29
End: 2024-11-22
Payer: COMMERCIAL

## 2024-11-22 VITALS — BODY MASS INDEX: 24.13 KG/M2 | WEIGHT: 145 LBS | DIASTOLIC BLOOD PRESSURE: 68 MMHG | SYSTOLIC BLOOD PRESSURE: 102 MMHG

## 2024-11-22 DIAGNOSIS — Z34.91 PRENATAL CARE IN FIRST TRIMESTER (HHS-HCC): ICD-10-CM

## 2024-11-22 DIAGNOSIS — Z3A.01 6 WEEKS GESTATION OF PREGNANCY (HHS-HCC): Primary | ICD-10-CM

## 2024-11-22 PROCEDURE — 87491 CHLMYD TRACH DNA AMP PROBE: CPT

## 2024-11-22 PROCEDURE — 90471 IMMUNIZATION ADMIN: CPT | Performed by: OBSTETRICS & GYNECOLOGY

## 2024-11-22 PROCEDURE — 87591 N.GONORRHOEAE DNA AMP PROB: CPT

## 2024-11-22 PROCEDURE — 0500F INITIAL PRENATAL CARE VISIT: CPT | Performed by: OBSTETRICS & GYNECOLOGY

## 2024-11-22 PROCEDURE — 87086 URINE CULTURE/COLONY COUNT: CPT

## 2024-11-22 PROCEDURE — 90656 IIV3 VACC NO PRSV 0.5 ML IM: CPT | Performed by: OBSTETRICS & GYNECOLOGY

## 2024-11-22 ASSESSMENT — EDINBURGH POSTNATAL DEPRESSION SCALE (EPDS)
I HAVE LOOKED FORWARD WITH ENJOYMENT TO THINGS: AS MUCH AS I EVER DID
I HAVE BEEN SO UNHAPPY THAT I HAVE BEEN CRYING: NO, NEVER
I HAVE FELT SCARED OR PANICKY FOR NO GOOD REASON: NO, NOT AT ALL
I HAVE BEEN ABLE TO LAUGH AND SEE THE FUNNY SIDE OF THINGS: AS MUCH AS I ALWAYS COULD
THE THOUGHT OF HARMING MYSELF HAS OCCURRED TO ME: NEVER
I HAVE BEEN ANXIOUS OR WORRIED FOR NO GOOD REASON: HARDLY EVER
TOTAL SCORE: 2
THINGS HAVE BEEN GETTING ON TOP OF ME: NO, I HAVE BEEN COPING AS WELL AS EVER
I HAVE FELT SAD OR MISERABLE: NO, NOT AT ALL
I HAVE BEEN SO UNHAPPY THAT I HAVE HAD DIFFICULTY SLEEPING: NOT AT ALL
I HAVE BLAMED MYSELF UNNECESSARILY WHEN THINGS WENT WRONG: NOT VERY OFTEN

## 2024-11-22 NOTE — PROGRESS NOTES
Subjective   Patient ID [id number]   Georgia Angel is a 29 y.o.  at 6w4d with a working estimated date of delivery of 2025, by Ultrasound who presents for an initial prenatal visit.    Her pregnancy is complicated by:  -no LMP, conceived after recent miscarriage  -h/o full term  c/b retained placenta    OB History    Para Term  AB Living   3 1 1 0 1 1   SAB IAB Ectopic Multiple Live Births   1 0 0 0 1      # Outcome Date GA Lbr Harry/2nd Weight Sex Type Anes PTL Lv   3 Current            2 SAB 24 7w0d    Complete      1 Term 10/06/23 39w3d 04:45 / 03:10 3.33 kg M Vag-Spont None  ANTOINE     [place]  Depression Scale Total: 2    Objective   Physical Exam  Weight: 65.8 kg (145 lb)  Expected Total Weight Gain: Could not be calculated   Pregravid BMI: Could not be calculated  BP: 102/68    OBGyn Exam  Gen in NAD  TVUS with IUP with +FCA with CRL 6w4d    Prenatal Labs  pending    Assessment/Plan   28 y/o  at 6.4 wks by CRL (no LMP) presenting for new ob visit.  -continue prenatal vitamin  -desires NIPS, will draw once past 10 wks  -patient to schedule NT scan  -flu shot given today  -recommended updated covid booster  -patient well aware of practice, collaborative care [facility] as delivered her last baby with us  -RTC 2 wks for repeat scan given recent MAB    Erendira Gilmore MD

## 2024-11-23 PROBLEM — Z3A.39 39 WEEKS GESTATION OF PREGNANCY (HHS-HCC): Status: RESOLVED | Noted: 2023-10-04 | Resolved: 2024-11-23

## 2024-11-23 PROBLEM — Z3A.01 6 WEEKS GESTATION OF PREGNANCY (HHS-HCC): Status: ACTIVE | Noted: 2024-11-23

## 2024-11-23 LAB
C TRACH RRNA SPEC QL NAA+PROBE: NEGATIVE
N GONORRHOEA DNA SPEC QL PROBE+SIG AMP: NEGATIVE

## 2024-11-24 LAB — BACTERIA UR CULT: NORMAL

## 2024-12-09 ENCOUNTER — APPOINTMENT (OUTPATIENT)
Dept: OBSTETRICS AND GYNECOLOGY | Facility: CLINIC | Age: 29
End: 2024-12-09
Payer: COMMERCIAL

## 2024-12-09 VITALS — DIASTOLIC BLOOD PRESSURE: 77 MMHG | SYSTOLIC BLOOD PRESSURE: 128 MMHG | BODY MASS INDEX: 24.63 KG/M2 | WEIGHT: 148 LBS

## 2024-12-09 DIAGNOSIS — Z3A.09 9 WEEKS GESTATION OF PREGNANCY (HHS-HCC): Primary | ICD-10-CM

## 2024-12-09 PROCEDURE — 0501F PRENATAL FLOW SHEET: CPT | Performed by: OBSTETRICS & GYNECOLOGY

## 2024-12-09 NOTE — PROGRESS NOTES
Routine ob at 9.0 wks.  FCA seen by US today.  CRL 9w4d on multiple measurements, keep KADEEM by prior 6w4d scan, KADEEM 7/14/25.  Has NT scan scheduled.  RTC 1-2 wks for NIPS, routine ob labs etc.

## 2024-12-17 ENCOUNTER — APPOINTMENT (OUTPATIENT)
Dept: OBSTETRICS AND GYNECOLOGY | Facility: CLINIC | Age: 29
End: 2024-12-17
Payer: COMMERCIAL

## 2024-12-17 ENCOUNTER — LAB (OUTPATIENT)
Dept: LAB | Facility: LAB | Age: 29
End: 2024-12-17
Payer: COMMERCIAL

## 2024-12-17 VITALS — SYSTOLIC BLOOD PRESSURE: 116 MMHG | DIASTOLIC BLOOD PRESSURE: 79 MMHG | WEIGHT: 149 LBS | BODY MASS INDEX: 24.79 KG/M2

## 2024-12-17 DIAGNOSIS — Z3A.10 10 WEEKS GESTATION OF PREGNANCY (HHS-HCC): ICD-10-CM

## 2024-12-17 LAB
ABO GROUP (TYPE) IN BLOOD: NORMAL
ANTIBODY SCREEN: NORMAL
ERYTHROCYTE [DISTWIDTH] IN BLOOD BY AUTOMATED COUNT: 13.2 % (ref 11.5–14.5)
EST. AVERAGE GLUCOSE BLD GHB EST-MCNC: 94 MG/DL
HBA1C MFR BLD: 4.9 %
HCT VFR BLD AUTO: 38 % (ref 36–46)
HGB BLD-MCNC: 12.3 G/DL (ref 12–16)
HIV 1+2 AB+HIV1 P24 AG SERPL QL IA: NONREACTIVE
MCH RBC QN AUTO: 28.8 PG (ref 26–34)
MCHC RBC AUTO-ENTMCNC: 32.4 G/DL (ref 32–36)
MCV RBC AUTO: 89 FL (ref 80–100)
NRBC BLD-RTO: 0 /100 WBCS (ref 0–0)
PLATELET # BLD AUTO: 242 X10*3/UL (ref 150–450)
RBC # BLD AUTO: 4.27 X10*6/UL (ref 4–5.2)
RH FACTOR (ANTIGEN D): NORMAL
WBC # BLD AUTO: 8.9 X10*3/UL (ref 4.4–11.3)

## 2024-12-17 PROCEDURE — 86780 TREPONEMA PALLIDUM: CPT

## 2024-12-17 PROCEDURE — 87340 HEPATITIS B SURFACE AG IA: CPT

## 2024-12-17 PROCEDURE — 86850 RBC ANTIBODY SCREEN: CPT

## 2024-12-17 PROCEDURE — 0501F PRENATAL FLOW SHEET: CPT | Performed by: OBSTETRICS & GYNECOLOGY

## 2024-12-17 PROCEDURE — 36415 COLL VENOUS BLD VENIPUNCTURE: CPT

## 2024-12-17 PROCEDURE — 85027 COMPLETE CBC AUTOMATED: CPT

## 2024-12-17 PROCEDURE — 87389 HIV-1 AG W/HIV-1&-2 AB AG IA: CPT

## 2024-12-17 PROCEDURE — 86803 HEPATITIS C AB TEST: CPT

## 2024-12-17 PROCEDURE — 86901 BLOOD TYPING SEROLOGIC RH(D): CPT

## 2024-12-17 PROCEDURE — 83036 HEMOGLOBIN GLYCOSYLATED A1C: CPT

## 2024-12-17 PROCEDURE — 86900 BLOOD TYPING SEROLOGIC ABO: CPT

## 2024-12-17 PROCEDURE — 86317 IMMUNOASSAY INFECTIOUS AGENT: CPT

## 2024-12-17 NOTE — PROGRESS NOTES
Routine ob at 10.1 wks.  FCA seen by US.  Has NT scan scheduled.  OB labs and NIPS today. RTC 4 wks.

## 2024-12-18 LAB
HBV SURFACE AG SERPL QL IA: NONREACTIVE
HCV AB SER QL: NONREACTIVE
REFLEX ADDED, ANEMIA PANEL: NORMAL
RUBV IGG SERPL IA-ACNC: 1 IA
RUBV IGG SERPL QL IA: POSITIVE
TREPONEMA PALLIDUM IGG+IGM AB [PRESENCE] IN SERUM OR PLASMA BY IMMUNOASSAY: NONREACTIVE

## 2024-12-30 LAB
COMMENTS - MP RESULT TYPE: NORMAL
SCAN RESULT: NORMAL

## 2025-01-02 ENCOUNTER — HOSPITAL ENCOUNTER (OUTPATIENT)
Dept: RADIOLOGY | Facility: CLINIC | Age: 30
Discharge: HOME | End: 2025-01-02
Payer: COMMERCIAL

## 2025-01-02 DIAGNOSIS — O26.891 OTHER SPECIFIED PREGNANCY RELATED CONDITIONS, FIRST TRIMESTER (HHS-HCC): ICD-10-CM

## 2025-01-02 DIAGNOSIS — Z34.91 PRENATAL CARE IN FIRST TRIMESTER (HHS-HCC): ICD-10-CM

## 2025-01-02 PROCEDURE — 76813 OB US NUCHAL MEAS 1 GEST: CPT

## 2025-01-03 ENCOUNTER — OFFICE VISIT (OUTPATIENT)
Dept: URGENT CARE | Age: 30
End: 2025-01-03
Payer: COMMERCIAL

## 2025-01-03 ENCOUNTER — APPOINTMENT (OUTPATIENT)
Dept: URGENT CARE | Age: 30
End: 2025-01-03
Payer: COMMERCIAL

## 2025-01-03 ENCOUNTER — TELEPHONE (OUTPATIENT)
Dept: OBSTETRICS AND GYNECOLOGY | Facility: CLINIC | Age: 30
End: 2025-01-03
Payer: COMMERCIAL

## 2025-01-03 VITALS
OXYGEN SATURATION: 99 % | BODY MASS INDEX: 23.99 KG/M2 | HEIGHT: 65 IN | RESPIRATION RATE: 18 BRPM | WEIGHT: 144 LBS | SYSTOLIC BLOOD PRESSURE: 104 MMHG | HEART RATE: 102 BPM | TEMPERATURE: 98 F | DIASTOLIC BLOOD PRESSURE: 69 MMHG

## 2025-01-03 DIAGNOSIS — B02.9 ZOSTER WITHOUT COMPLICATIONS: Primary | ICD-10-CM

## 2025-01-03 PROCEDURE — 1036F TOBACCO NON-USER: CPT | Performed by: FAMILY MEDICINE

## 2025-01-03 PROCEDURE — 99203 OFFICE O/P NEW LOW 30 MIN: CPT | Performed by: FAMILY MEDICINE

## 2025-01-03 PROCEDURE — 3008F BODY MASS INDEX DOCD: CPT | Performed by: FAMILY MEDICINE

## 2025-01-03 RX ORDER — VALACYCLOVIR HYDROCHLORIDE 1 G/1
1000 TABLET, FILM COATED ORAL 3 TIMES DAILY
Qty: 21 TABLET | Refills: 0 | Status: SHIPPED | OUTPATIENT
Start: 2025-01-03 | End: 2025-01-10

## 2025-01-03 ASSESSMENT — PAIN SCALES - GENERAL: PAINLEVEL_OUTOF10: 6

## 2025-01-03 NOTE — TELEPHONE ENCOUNTER
Called and spoke with patient relaying Dr Art message. Advised to also hydrate and rest as much as possible. No questions at this time, but will reach out if any arise.

## 2025-01-03 NOTE — TELEPHONE ENCOUNTER
12.4 wk ob left message on ob line stating she just left her PCP and was diagnosed with shingles.  Patient has questions:    1) She was prescribed valtrex and wanted to make sure this is safe to take in her 1st trimester.    2) Is there any concern with having shingles in the 1st trimester with baby being effected?    Please advise

## 2025-01-03 NOTE — PROGRESS NOTES
"Subjective   Patient ID: Georgia Angel is a 29 y.o. female. They present today with a chief complaint of Rash (4-5 days/Rash on face / head/Pain, itching, burning).    History of Present Illness  HPI  29-year-old female 12 weeks pregnant, presents with a painful blistering rash for the past 4 to 5 days on the right side of her forehead and upper eyelid.  She denies any eye redness or pain.  No vision changes.  Patient had chickenpox as a child.  No fevers or chills.  No medication has been taken.  Past Medical History  Allergies as of 01/03/2025 - Reviewed 01/03/2025   Allergen Reaction Noted    Animal dander Unknown 09/23/2023    Milk containing products (dairy) Unknown 08/26/2023    Penicillins Unknown 08/26/2023       (Not in a hospital admission)       Past Medical History:   Diagnosis Date    Anemia, unspecified 08/26/2013    Anemia    Encounter for gynecological examination (general) (routine) without abnormal findings 08/10/2020    Encounter for well woman exam with routine gynecological exam    History of abnormal cervical Pap smear     Seasonal allergies     UTI (urinary tract infection)        Past Surgical History:   Procedure Laterality Date    MR HEAD ANGIO WO IV CONTRAST  9/21/2023    MR HEAD ANGIO WO IV CONTRAST 9/21/2023 [facility]        reports that she has never smoked. She has never used smokeless tobacco. She reports that she does not currently use alcohol. She reports that she does not use drugs.    Review of Systems  Review of Systems     As in history of present illness                          Objective    Vitals:    01/03/25 0805   BP: 104/69   BP Location: Left arm   Patient Position: Sitting   BP Cuff Size: Adult   Pulse: 102   Resp: 18   Temp: 36.7 °C (98 °F)   TempSrc: Oral   SpO2: 99%   Weight: 65.3 kg (144 lb)   Height: 1.651 m (5' 5\")     No LMP recorded. Patient is pregnant.    Physical Exam  General: Vitals noted, no distress. Afebrile.   EENT: TMs clear. Posterior oropharynx " unremarkable.  Eyes: No erythema, corneal lesions or corneal swelling  Cardiac: Regular, rate, rhythm, no murmur.   Pulmonary: Lungs clear bilaterally with good aeration. No adventitious breath sounds.    Extremities: No peripheral edema. Negative Homans bilaterally, no cords.   Skin: Patchy erythematous vesicles noted over right forehead with no pustules.  A small vesicles noted on the right upper eyelid  Procedures    Point of Care Test & Imaging Results from this visit  No results found for this visit on 25.   US OB NT (NUCHAL translucency)    Result Date: 2025  Interpreted by: Noreen Thomas Indication ======== Nuchal Translucency Screening History ====== General History Smoking: no Height 165 cm Height (ft) 5 ft Height (in) 5 in Previous Outcomes  3 Para 1 Children born living ?37w 1 Pregnancies delivered at term (T) 1 Abortions (A) 1 Living children (L) 1 Miscarriages 1 Other: Vaginal Delivery Maternal Assessment ================= Height 165 cm Height (ft) 5 ft Height (in) 5 in Weight 65 kg Weight (lb) 144 lb Weight gain 0 kg Weight gain (lb) 0 lb BMI 23.96 kg/mï¿½ Physical Exam Initial weight (lb) 144 lb Pregnancy ========= Bacon pregnancy. Number of fetuses: 1 Dating ====== Conception: LMP GA by prior assessment 12 w + 3 d KADEEM by prior assessment: 2025 Previous Ultrasound on: 2025 Type of prior assessment: CRL U/S measurement at prior assessment date 64.7 mm GA at prior assessment date 6 w + 4 d GA by previous U/S 12 w + 4 d KADEEM by previous Ultrasound: 2025 Assigned: based on stated KADEEM, selected on 2025 Assigned GA 12 w + 3 d Assigned KADEEM: 2025 Impression ========= A first trimester anatomic survey is being performed. Cell free DNA screening has not been drawn. The LMP is unknown and a formal dating ultrasound has not yet been performed. -Bacon fetus with cardiac activity -Crown rump length is consistent with the KADEEM determined today, 25 -Fetal organ  survey is within normal limits for gestational age with some suboptimal views -Nuchal translucency is within normal limits -Unremarkable placenta and adnexa A first trimester anatomic examination cannot exclude all fetal malformations or genetic conditions. The detection rate of today's exam for the core fetal trisomies is significantly lower compared to cell free DNA screening. Clinical correlation is recommended. This exam does not replace the second trimester anatomic survey. Thank you for allowing us to participate in your patient's care. Follow-up ======== Anatomic survey at 19 weeks General Evaluation ============== Cardiac activity present Placenta: appears appropriate for gestational age Cord vessels: not visualized Amniotic fluid: appears appropriate for gestational age Fetal Biometry ============ Standard  bpm CRL 64.7 mm 12w 4d 72% Pexsters NT 1.60 mm Fetal Anatomy =========== Heart: Normal Axis and Situs. Stomach: Visible, with correct situs. Bladder: Visible. Arms: Both Upper Extremities Seen. Legs: Both Lower Extremities Seen. The following structures appear normal: Cranium. Face: Nasal Bone Present. Neck. Abdominal wall: Normal abdominal cord insertion. The following structures could not be adequately visualized: Thorax. Spine. Maternal Structures =============== Uterus / Cervix Uterus: Visualized Uterus position: anteverted Cervix: Visualized Cervix details: Normal Ovaries / Tubes / Adnexa Rt ovary: Visualized Rt ovary details: Normal Rt ovary morphology: normal Rt ovary D1 36.4 mm Rt ovary D2 20.8 mm Rt ovary D3 17.5 mm Rt ovary Vol 6.9 cmï¿½ Lt ovary: Visualized Lt ovary details: Normal Lt ovary morphology: normal Cul de Sac / Bladder / Kidneys / Other Cul de Sac: Visualized Free fluid: No free fluid visualized Method ====== Transabdominal ultrasound examination. View: Sufficient     Diagnostic study results (if any) were reviewed by Butch Ferguson MD.    Assessment/Plan   Allergies,  medications, history, and pertinent labs/EKGs/Imaging reviewed by Butch Ferguson MD.     Medical Decision Making  At time of discharge patient was clinically well-appearing and HDS for outpatient management. The patient and/or family was educated regarding diagnosis, supportive care, OTC and Rx medications. The patient and/or family was given the opportunity to ask questions prior to discharge.  They verbalized understanding of my discussion of the plans for treatment, expected course, indications to return to  or seek further evaluation in ED, and the need for timely follow up as directed.   They were provided with a work/school excuse if requested.    Orders and Diagnoses  Diagnoses and all orders for this visit:  Zoster without complications  -     valACYclovir (Valtrex) 1 gram tablet; Take 1 tablet (1,000 mg) by mouth 3 times a day for 7 days.      Medical Admin Record      Patient disposition: Home    Electronically signed by Butch Ferguson MD  8:29 AM

## 2025-01-03 NOTE — PATIENT INSTRUCTIONS
Wash hands frequently and covering lesions when able  Avoid direct contact with individuals with not had chickenpox or who have not been vaccinated for chickenpox  Take medication 3 times a day until completed.  Confirm with obstetrician before starting  Go to ER for vision changes or increasing pain

## 2025-01-22 ENCOUNTER — APPOINTMENT (OUTPATIENT)
Dept: OBSTETRICS AND GYNECOLOGY | Facility: CLINIC | Age: 30
End: 2025-01-22
Payer: COMMERCIAL

## 2025-01-22 VITALS — WEIGHT: 152 LBS | BODY MASS INDEX: 25.29 KG/M2 | DIASTOLIC BLOOD PRESSURE: 71 MMHG | SYSTOLIC BLOOD PRESSURE: 116 MMHG

## 2025-01-22 DIAGNOSIS — R10.2 PELVIC PAIN AFFECTING PREGNANCY IN SECOND TRIMESTER, ANTEPARTUM (HHS-HCC): Primary | ICD-10-CM

## 2025-01-22 DIAGNOSIS — Z3A.15 15 WEEKS GESTATION OF PREGNANCY (HHS-HCC): ICD-10-CM

## 2025-01-22 DIAGNOSIS — O26.892 PELVIC PAIN AFFECTING PREGNANCY IN SECOND TRIMESTER, ANTEPARTUM (HHS-HCC): Primary | ICD-10-CM

## 2025-01-22 PROCEDURE — 0501F PRENATAL FLOW SHEET: CPT | Performed by: OBSTETRICS & GYNECOLOGY

## 2025-01-22 NOTE — PROGRESS NOTES
Routine ob at 15.2 wks.  Feeling well overall.  Had shingles on the right side of her forehead, resolving and s/p valtrex.  C/o pelvic pain and XOCHITL, will place order for PFPT.  Has anatomy scan scheduled.  RTC 4 wks.

## 2025-02-12 ENCOUNTER — EVALUATION (OUTPATIENT)
Dept: PHYSICAL THERAPY | Facility: CLINIC | Age: 30
End: 2025-02-12
Payer: COMMERCIAL

## 2025-02-12 DIAGNOSIS — O26.892 PELVIC PAIN AFFECTING PREGNANCY IN SECOND TRIMESTER, ANTEPARTUM (HHS-HCC): ICD-10-CM

## 2025-02-12 DIAGNOSIS — N39.3 STRESS INCONTINENCE OF URINE: ICD-10-CM

## 2025-02-12 DIAGNOSIS — R10.2 PELVIC PAIN AFFECTING PREGNANCY IN SECOND TRIMESTER, ANTEPARTUM (HHS-HCC): ICD-10-CM

## 2025-02-12 DIAGNOSIS — M62.81 MUSCLE WEAKNESS: Primary | ICD-10-CM

## 2025-02-12 PROCEDURE — 97161 PT EVAL LOW COMPLEX 20 MIN: CPT | Mod: GP

## 2025-02-12 PROCEDURE — 97110 THERAPEUTIC EXERCISES: CPT | Mod: GP

## 2025-02-12 ASSESSMENT — PAIN DESCRIPTION - DESCRIPTORS: DESCRIPTORS: ACHING

## 2025-02-12 ASSESSMENT — PAIN SCALES - GENERAL: PAINLEVEL_OUTOF10: 1

## 2025-02-12 ASSESSMENT — ENCOUNTER SYMPTOMS
LOSS OF SENSATION IN FEET: 0
DEPRESSION: 0
OCCASIONAL FEELINGS OF UNSTEADINESS: 0

## 2025-02-12 ASSESSMENT — PAIN - FUNCTIONAL ASSESSMENT: PAIN_FUNCTIONAL_ASSESSMENT: 0-10

## 2025-02-12 NOTE — PROGRESS NOTES
Physical Therapy    Physical Therapy Evaluation    Patient Name: Georgia Angel  MRN: [id number]  Today's Date: 2025  Last name and  confirmed verbally by patient.    Time Entry:   Time Calculation  Start Time: 1340  Stop Time: 1430  Time Calculation (min): 50 min  PT Evaluation Time Entry  PT Evaluation (Low) Time Entry: 25  PT Therapeutic Procedures Time Entry  Therapeutic Exercise Time Entry: 25                    Reason for Visit:  Referred by: Erendira Gilmore  Referral diagnosis: O26.892,R10.2 (ICD-10-CM) - Pelvic pain affecting pregnancy in second trimester, antepartum ([facility])     Insurance:  Visit: #1  Authorized: , ANTHEM, NO AUTH REQ, MED NEC VISITS, 100% COVERAGE  Payor/Plan: Payor: ANTHEM / Plan: ANTHEM HMP / Product Type: *No Product type* /     Current Problem  1. Muscle weakness        2. Pelvic pain affecting pregnancy in second trimester, antepartum ([facility])  Referral to Physical Therapy      3. Stress incontinence of urine            Assessment   Georgia Angel presents with chief complaint of pelvic pain and stress urinary incontinence during pregnancy. Functional limitations include bladder function, carrying toddler, walking long distances. Impairments include strength, range of motion, independence in exercise, tissue tenderness. Patient demonstrates decreased strength of bilateral pelvic girdle musculature, altered posture. Patient likely to benefit from skilled physical therapy to return to prior level of function and maximize functional outcome.    Plan  Treatment/Interventions: Biofeedback, Blood flow restriction therapy, Dry needling, Education/ Instruction, Electrical stimulation, Gait training, Manual therapy, Neuromuscular re-education, Self care/ home management, Taping techniques, Therapeutic activities, Therapeutic exercises, Ultrasound  PT Plan: Skilled PT  PT Frequency: 1 time per week  Duration: 12 weeks  Onset Date: 25  Rehab Potential: Good  Plan of Care  Agreement: Patient    Next Visit Plan: internal assessment as appropriate, progress exercise, manual as needed, biofeedback?      Goals:  Patient will demonstrate independence and report adherence with home exercise program (HEP) to facilitate independent rehab program upon discharge to maximize functional gains.  Patient to report 75% improvement of leaking to demonstrate increased strength of pelvic floor musculature and improved bladder control.  Patient to coordinate thoracic diaphragm with pelvic floor musculature to improve bladder function and decrease leaks.  Patient will report 75% improvement in pain with carrying toddler  Patient to verbalize confidence in labor/delivery/post partum plan and preparation  Patient will improve bilateral lower extremity strength during manual muscle testing to 4+/5 for improved lumbopelvic stability    Subjective   Date of Onset: 3 weeks ago - pain, leaking since last pregnancy/birth but gotten worse this pregnancy  Chief Complaint:   The patient is currently 18 weeks pregnant with her second baby. She reports experiencing pelvic pain, which started a bit earlier this pregnancy compared to her first. The pain is primarily on the right side, which is her dominant side. She also experiences some low back pain, particularly when carrying her [id number] son, whom she primarily carries on her right side. The pelvic pain now feels like tension or an ache, and she is trying to stay ahead of it. In her first pregnancy, similar symptoms appeared around 28 weeks and lasted throughout, but this time, the pain began earlier. She is also noticing that she has random episodes of incontinence.  For her first pregnancy, she had a vaginal delivery with a 1st-degree tear and retained placenta.    Relevant PMH: none  Relevant PSH: none  Red flags: Do you have any of the following? no   Fever/chills, unexplained weight changes, dizziness/fainting, unexplained change in bowel or bladder  functions, unexplained malaise or muscle weakness, night pain/sweats, numbness or tingling  Occupation:  - oncology  Exercise: no - likes to do barre, go on walks  Patient stated goals for treatment: strengthen pelvic floor, prevent pelvic pain from getting really bad    Bladder Screen:  Daytime voiding: has increased since pregnancy  Nighttime voidin time normally, now multiple x a night  Leakage (amount, frequency, protection, activity): at least 2x a day - small amount, fully on peeing 1x a week, strong sneeze/standing up too quickly/vomiting)  Incomplete emptying: yes - with this pregnancy    Bowel Screen:  No issues with bowel movement     Sexual health screen:  Currently sexually active: yes  Pain with intercourse: no     Barriers Impacting Care:  None    Precautions:  Precautions  STEADI Fall Risk Score (The score of 4 or more indicates an increased risk of falling): 0      Pain:  Pain Assessment: 0-10  0-10 (Numeric) Pain Score: 1 (3 at worst, 0 at best when laying down)  Pain Location:  (Round ligament)  Pain Descriptors: Aching        Objective   Additional Verbalized Informed Consent Explained by Treating Therapist: Yes  Patient Understanding of Examination Techniques:  The patient has been informed about the various examination techniques to be utilized today, including both external and internal methods. A detailed explanation of the purpose and benefits of these techniques has been provided, ensuring the patient has a clear understanding of each aspect.    Consent and Autonomy:  The patient understands that consent for the examination is an ongoing process. They have the right to withdraw consent and terminate the examination at any point should they feel uncomfortable or wish to discontinue for any reason.  The patient acknowledges the importance of communication during the examination and agrees to promptly inform the examiner if they experience any discomfort at any time. This will  allow for immediate adjustments to be made to ensure their comfort and safety.    Alternatives to Internal Examination:  The patient is aware of alternative options to internal examinations, includin. Education and Instruction: Providing information and guidance without performing an internal exam.  2. External Examination: Conducting a pelvic exam while the patient remains clothed or partially clothed.  3. No Examination: The option to forgo the examination entirely if the patient chooses.  The patient has confirmed their understanding of these alternatives and their implications.    Conclusion:  The patient’s consent is based on a thorough understanding of the risks, benefits, and purposes of the examination techniques discussed today. They have been encouraged to ask questions and express any concerns they may have, ensuring an informed and collaborative approach to their care.    No internal assessment today    Ortho:  Posture:  Elevated L shoulder, increased lumbar lordosis    Gait::  WNL    Lumbar AROM:  Flex: min limitation  Ext: WNL  SB R: WNL  SB L: within normal limits (pain on R)  Rotation R: min limitation  Rotation L: min limitation     SLS:   R: 10s+  L: 10s+    Flexibility:  Hamstrings: R -10, L -10  Hip flexors: R min limitation, L min limitation  Hip external rotation: R WNL, L WNL  Hip internal rotation: R min limitation, L min limitation  Adductors: R WNL, L WNL    Isometric Abdominal Contraction:  Transverse abdominis poor, oblique dominant strategy, improves with cueing     Strength:   Hip flexion: R 4/5, L 4/5  Hip internal rotation: R 4+/5, L 4+/5  Hip external rotation: R 4/5, L 4/5  Hip abduction: R 4-/5, L 4-/5    Palpation:  Tenderness of pubic symphysis, no pain    Outcome Measures:  Other Measures  Other Outcome Measures: Female NIH-CPSI = 21     TREATMENT  Therapeutic exercise:  Educated patient on pelvic floor anatomy and relationship to patient's specific diagnosis  Educated  patient on role of physical therapy and plan of care  Educated patient on muscle tension vs weakness vs incoordination  *Clamshell x10  *Reverse clamshell x10  *Bridge with adduction on pillow and PME x10  Instructed proper transverse abdominis contraction in supine hooklying - cues to engage pelvic floor with transverse abdominis worked best  *Supine hooklying transverse abdominis isometric contractions with PME 3x10  *Seated hip flexor stretch bilateral x30s      Education:  *HEP: Patient verbalizes and demonstrates understanding of home exercises. Patient will contact PT with questions or if condition worsens.    Access Code: [email or web address]  URL: [email or web address]  Date: 02/12/2025  Prepared by: Bel Ashley    Exercises  - Clamshell  - 1 x daily - 7 x weekly - 3 sets - 10 reps  - Sidelying Reverse Clamshell  - 1 x daily - 7 x weekly - 3 sets - 10 reps  - Supine Bridge with Mini Swiss Ball Between Knees  - 1 x daily - 7 x weekly - 3 sets - 10 reps  - Seated Hip Flexor Stretch  - 3 x daily - 7 x weekly - 1 sets - 1 min hold  - Supine Transversus Abdominis Bracing with Pelvic Floor Contraction  - 3 x daily - 7 x weekly - 1 sets - 10 reps    Bel Ashley, PT, DPT

## 2025-02-17 ENCOUNTER — HOSPITAL ENCOUNTER (OUTPATIENT)
Dept: RADIOLOGY | Facility: CLINIC | Age: 30
Discharge: HOME | End: 2025-02-17
Payer: COMMERCIAL

## 2025-02-17 DIAGNOSIS — O35.9XX0 MATERNAL CARE FOR (SUSPECTED) FETAL ABNORMALITY AND DAMAGE, UNSPECIFIED, NOT APPLICABLE OR UNSPECIFIED: ICD-10-CM

## 2025-02-17 DIAGNOSIS — Z34.90 ENCOUNTER FOR SUPERVISION OF NORMAL PREGNANCY, UNSPECIFIED, UNSPECIFIED TRIMESTER: ICD-10-CM

## 2025-02-17 DIAGNOSIS — Z84.81 FAMILY HISTORY OF CARRIER OF HEREDITARY DISEASE: ICD-10-CM

## 2025-02-17 PROCEDURE — 76811 OB US DETAILED SNGL FETUS: CPT

## 2025-02-17 PROCEDURE — 76811 OB US DETAILED SNGL FETUS: CPT | Performed by: OBSTETRICS & GYNECOLOGY

## 2025-02-18 ENCOUNTER — APPOINTMENT (OUTPATIENT)
Dept: OBSTETRICS AND GYNECOLOGY | Facility: CLINIC | Age: 30
End: 2025-02-18
Payer: COMMERCIAL

## 2025-02-18 VITALS — SYSTOLIC BLOOD PRESSURE: 110 MMHG | WEIGHT: 156 LBS | BODY MASS INDEX: 25.96 KG/M2 | DIASTOLIC BLOOD PRESSURE: 73 MMHG

## 2025-02-18 DIAGNOSIS — Z3A.19 19 WEEKS GESTATION OF PREGNANCY (HHS-HCC): Primary | ICD-10-CM

## 2025-02-18 PROCEDURE — 0501F PRENATAL FLOW SHEET: CPT | Performed by: OBSTETRICS & GYNECOLOGY

## 2025-02-18 NOTE — PROGRESS NOTES
Routine ob at 19.1 wks.  Feeling well.  Feeling fetal movement.  Normal anatomy scan.  RTC 4 wks.

## 2025-02-19 ENCOUNTER — APPOINTMENT (OUTPATIENT)
Dept: PHYSICAL THERAPY | Facility: CLINIC | Age: 30
End: 2025-02-19
Payer: COMMERCIAL

## 2025-02-26 ENCOUNTER — APPOINTMENT (OUTPATIENT)
Dept: PHYSICAL THERAPY | Facility: CLINIC | Age: 30
End: 2025-02-26
Payer: COMMERCIAL

## 2025-03-05 ENCOUNTER — APPOINTMENT (OUTPATIENT)
Dept: PHYSICAL THERAPY | Facility: CLINIC | Age: 30
End: 2025-03-05
Payer: COMMERCIAL

## 2025-03-18 ENCOUNTER — APPOINTMENT (OUTPATIENT)
Dept: OBSTETRICS AND GYNECOLOGY | Facility: CLINIC | Age: 30
End: 2025-03-18
Payer: COMMERCIAL

## 2025-03-18 VITALS — WEIGHT: 158 LBS | SYSTOLIC BLOOD PRESSURE: 124 MMHG | DIASTOLIC BLOOD PRESSURE: 79 MMHG | BODY MASS INDEX: 26.29 KG/M2

## 2025-03-18 DIAGNOSIS — Z3A.23 23 WEEKS GESTATION OF PREGNANCY (HHS-HCC): Primary | ICD-10-CM

## 2025-03-18 PROCEDURE — 0501F PRENATAL FLOW SHEET: CPT | Performed by: OBSTETRICS & GYNECOLOGY

## 2025-03-18 NOTE — PROGRESS NOTES
Routine ob at 23.1 wks.  Feeling well overall.  Good FM.  RTC 4 wks, 1 hour and Tdap then if >27 wks.

## 2025-03-19 ENCOUNTER — TREATMENT (OUTPATIENT)
Dept: PHYSICAL THERAPY | Facility: CLINIC | Age: 30
End: 2025-03-19
Payer: COMMERCIAL

## 2025-03-19 DIAGNOSIS — O26.892 PELVIC PAIN AFFECTING PREGNANCY IN SECOND TRIMESTER, ANTEPARTUM (HHS-HCC): ICD-10-CM

## 2025-03-19 DIAGNOSIS — N39.3 STRESS INCONTINENCE OF URINE: ICD-10-CM

## 2025-03-19 DIAGNOSIS — M62.81 MUSCLE WEAKNESS: ICD-10-CM

## 2025-03-19 DIAGNOSIS — R10.2 PELVIC PAIN AFFECTING PREGNANCY IN SECOND TRIMESTER, ANTEPARTUM (HHS-HCC): ICD-10-CM

## 2025-03-19 PROCEDURE — 97535 SELF CARE MNGMENT TRAINING: CPT | Mod: GP

## 2025-03-19 PROCEDURE — 97110 THERAPEUTIC EXERCISES: CPT | Mod: GP

## 2025-03-19 ASSESSMENT — PAIN SCALES - GENERAL: PAINLEVEL_OUTOF10: 1

## 2025-03-19 ASSESSMENT — PAIN DESCRIPTION - DESCRIPTORS: DESCRIPTORS: ACHING

## 2025-03-19 ASSESSMENT — PAIN - FUNCTIONAL ASSESSMENT: PAIN_FUNCTIONAL_ASSESSMENT: 0-10

## 2025-03-19 NOTE — PROGRESS NOTES
Physical Therapy    Physical Therapy Treatment    Patient Name: Georgia Angel  MRN: [id number]  Today's Date: 3/19/2025    Time Entry:   Time Calculation  Start Time: 1035  Stop Time: 1130  Time Calculation (min): 55 min     PT Therapeutic Procedures Time Entry  Therapeutic Exercise Time Entry: 35  Self-Care/Home Mgmt Trainin                    Insurance:  Visit: #2  Authorized: , ANTHEM, NO AUTH REQ, MED NEC VISITS, 100% COVERAGE  Payor/Plan: Payor: ANTHEM / Plan: ANTHEM HMP / Product Type: *No Product type* /     Current Problem  1. Muscle weakness  Follow Up In Physical Therapy      2. Pelvic pain affecting pregnancy in second trimester, antepartum ([facility])  Follow Up In Physical Therapy      3. Stress incontinence of urine  Follow Up In Physical Therapy          Assessment   Patient demonstrates difficulty engaging transverse abdominis that improves with cueing. Patient will continue to benefit from skilled physical therapy to improve function and meet therapy goals.    Plan  Next Visit Plan: review transverse abdominis,  baby positioning?, labor/birth prep progress exercise, k-tape or manual as needed      Goals:  Patient will demonstrate independence and report adherence with home exercise program (HEP) to facilitate independent rehab program upon discharge to maximize functional gains.  Patient to report 75% improvement of leaking to demonstrate increased strength of pelvic floor musculature and improved bladder control.  Patient to coordinate thoracic diaphragm with pelvic floor musculature to improve bladder function and decrease leaks.  Patient will report 75% improvement in pain with carrying toddler  Patient to verbalize confidence in labor/delivery/post partum plan and preparation  Patient will improve bilateral lower extremity strength during manual muscle testing to 4+/5 for improved lumbopelvic stability    Subjective   Has been noticing when picking up toddler that belly is  pulling.    Stress incontinence has been a little bit letter. Bracing before sneeze. Hasn't had full loss of bladder in a couple of weeks    Pain:  Pain Assessment: 0-10  0-10 (Numeric) Pain Score: 1  Pain Descriptors: Aching           Objective   Additional Verbalized Informed Consent Explained by Treating Therapist: Yes  Patient Understanding of Examination Techniques:  The patient has been informed about the various examination techniques to be utilized today, including both external and internal methods. A detailed explanation of the purpose and benefits of these techniques has been provided, ensuring the patient has a clear understanding of each aspect.    Consent and Autonomy:  The patient understands that consent for the examination is an ongoing process. They have the right to withdraw consent and terminate the examination at any point should they feel uncomfortable or wish to discontinue for any reason.  The patient acknowledges the importance of communication during the examination and agrees to promptly inform the examiner if they experience any discomfort at any time. This will allow for immediate adjustments to be made to ensure their comfort and safety.    Alternatives to Internal Examination:  The patient is aware of alternative options to internal examinations, includin. Education and Instruction: Providing information and guidance without performing an internal exam.  2. External Examination: Conducting a pelvic exam while the patient remains clothed or partially clothed.  3. No Examination: The option to forgo the examination entirely if the patient chooses.  The patient has confirmed their understanding of these alternatives and their implications.    Conclusion:  The patient’s consent is based on a thorough understanding of the risks, benefits, and purposes of the examination techniques discussed today. They have been encouraged to ask questions and express any concerns they may have,  ensuring an informed and collaborative approach to their care.    Patient does not want to do internal work    TREATMENT    Therapeutic exercise:  Prabhakar clamshell progression bilaterally:  Clamshells x10  Reverse clamshells x10  Hip in abduction and flexion with internal rotation x10  Hip in neutral abduction with internal rotation x10  Bridge with ball for adduction 2x10  Instructed proper transverse abdominis contraction in supine hooklying with PME  Supine hooklying transverse abdominis isometric contractions x10  *Bird dog 2x10  *Child's pose with diaphragmatic breathing 2x10 breaths  Instructed proper pelvic floor lengthening in supine hooklying - difficulty with performing, tried seated with towel roll for feedback but still difficult  Practiced transverse abdominis and pelvic floor engagement paired with breathing when picking up toddler - demonstration/practice with 15# medicine ball      Self-care/home management:  Discussed diastasis recti causes/prevention  Round ligament pain after walking - can use stretch or massage to help  Pushing and role of pelvic floor       Education:  Home exercise program: Reviewed and educated patient on additions/changes for home exercise program as above (*)     Access Code: [email or web address]  URL: [email or web address]  Date: 03/19/2025  Prepared by: Bel Ashley    Exercises  - Clamshell  - 1 x daily - 7 x weekly - 1-2 sets - 10 reps  - Sidelying Reverse Clamshell  - 1 x daily - 7 x weekly - 1-2 sets - 10 reps  - Sidelying Hip Flexion and Abduction with Internal Rotation  - 1 x daily - 7 x weekly - 1-2 sets - 10 reps  - Reverse Clamshell in Extension and Abduction  - 1 x daily - 7 x weekly - 1-2 sets - 10 reps  - Supine Bridge with Mini Swiss Ball Between Knees  - 1 x daily - 7 x weekly - 2 sets - 10 reps  - Seated Hip Flexor Stretch  - 3 x daily - 7 x weekly - 1 sets - 1 min hold  - Supine Transversus Abdominis Bracing with Pelvic Floor  Contraction  - 1 x daily - 7 x weekly - 1 sets - 10 reps  - Bird Dog  - 1 x daily - 7 x weekly - 2 sets - 10 reps  - Diaphragmatic Breathing in Child's Pose with Pelvic Floor Relaxation  - 1 x daily - 7 x weekly - 1 sets - 10 reps  - Supine Diaphragmatic Breathing with Pelvic Floor Lengthening  - 2 x daily - 7 x weekly - 1 sets - 10 reps  - Seated Pelvic Floor Lengthening  - 1 x daily - 7 x weekly - 1 sets - 10 reps  - Box Lift from Ground with Pelvic Floor Contraction     Bel Ashley, PT, DPT

## 2025-03-28 ENCOUNTER — TELEPHONE (OUTPATIENT)
Dept: OBSTETRICS AND GYNECOLOGY | Facility: CLINIC | Age: 30
End: 2025-03-28
Payer: COMMERCIAL
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
## 2025-03-31 ENCOUNTER — APPOINTMENT (OUTPATIENT)
Facility: CLINIC | Age: 30
End: 2025-03-31
Payer: COMMERCIAL

## 2025-04-16 ENCOUNTER — APPOINTMENT (OUTPATIENT)
Dept: OBSTETRICS AND GYNECOLOGY | Facility: CLINIC | Age: 30
End: 2025-04-16
Payer: COMMERCIAL

## 2025-04-18 ENCOUNTER — ROUTINE PRENATAL (OUTPATIENT)
Dept: OBSTETRICS AND GYNECOLOGY | Facility: CLINIC | Age: 30
End: 2025-04-18
Payer: COMMERCIAL

## 2025-04-18 VITALS — BODY MASS INDEX: 27.12 KG/M2 | DIASTOLIC BLOOD PRESSURE: 72 MMHG | WEIGHT: 163 LBS | SYSTOLIC BLOOD PRESSURE: 110 MMHG

## 2025-04-18 DIAGNOSIS — Z23 ENCOUNTER FOR VACCINATION: ICD-10-CM

## 2025-04-18 DIAGNOSIS — Z13.1 SCREENING FOR DIABETES MELLITUS: ICD-10-CM

## 2025-04-18 DIAGNOSIS — Z3A.27 27 WEEKS GESTATION OF PREGNANCY (HHS-HCC): Primary | ICD-10-CM

## 2025-04-18 LAB
ERYTHROCYTE [DISTWIDTH] IN BLOOD BY AUTOMATED COUNT: 13.1 % (ref 11.5–14.5)
HCT VFR BLD AUTO: 38.6 % (ref 36–46)
HGB BLD-MCNC: 12.2 G/DL (ref 12–16)
MCH RBC QN AUTO: 31.7 PG (ref 26–34)
MCHC RBC AUTO-ENTMCNC: 31.6 G/DL (ref 32–36)
MCV RBC AUTO: 100 FL (ref 80–100)
NRBC BLD-RTO: 0 /100 WBCS (ref 0–0)
PLATELET # BLD AUTO: 182 X10*3/UL (ref 150–450)
RBC # BLD AUTO: 3.85 X10*6/UL (ref 4–5.2)
WBC # BLD AUTO: 8.9 X10*3/UL (ref 4.4–11.3)

## 2025-04-18 PROCEDURE — 0501F PRENATAL FLOW SHEET: CPT | Performed by: OBSTETRICS & GYNECOLOGY

## 2025-04-18 PROCEDURE — 90715 TDAP VACCINE 7 YRS/> IM: CPT | Performed by: OBSTETRICS & GYNECOLOGY

## 2025-04-18 PROCEDURE — 90471 IMMUNIZATION ADMIN: CPT | Performed by: OBSTETRICS & GYNECOLOGY

## 2025-04-18 PROCEDURE — 85027 COMPLETE CBC AUTOMATED: CPT

## 2025-04-19 LAB
GLUCOSE 1H P 50 G GLC PO SERPL-MCNC: 73 MG/DL
REFLEX ADDED, ANEMIA PANEL: NORMAL

## 2025-04-23 ENCOUNTER — APPOINTMENT (OUTPATIENT)
Dept: PHYSICAL THERAPY | Facility: CLINIC | Age: 30
End: 2025-04-23
Payer: COMMERCIAL

## 2025-04-30 ENCOUNTER — APPOINTMENT (OUTPATIENT)
Dept: PHYSICAL THERAPY | Facility: CLINIC | Age: 30
End: 2025-04-30
Payer: COMMERCIAL

## 2025-05-07 ENCOUNTER — TREATMENT (OUTPATIENT)
Dept: PHYSICAL THERAPY | Facility: CLINIC | Age: 30
End: 2025-05-07
Payer: COMMERCIAL

## 2025-05-07 DIAGNOSIS — R10.2 PELVIC PAIN AFFECTING PREGNANCY IN SECOND TRIMESTER, ANTEPARTUM (HHS-HCC): ICD-10-CM

## 2025-05-07 DIAGNOSIS — O26.892 PELVIC PAIN AFFECTING PREGNANCY IN SECOND TRIMESTER, ANTEPARTUM (HHS-HCC): ICD-10-CM

## 2025-05-07 DIAGNOSIS — N39.3 STRESS INCONTINENCE OF URINE: ICD-10-CM

## 2025-05-07 DIAGNOSIS — M62.81 MUSCLE WEAKNESS: Primary | ICD-10-CM

## 2025-05-07 PROCEDURE — 97112 NEUROMUSCULAR REEDUCATION: CPT | Mod: GP

## 2025-05-07 PROCEDURE — 97110 THERAPEUTIC EXERCISES: CPT | Mod: GP

## 2025-05-07 PROCEDURE — 97140 MANUAL THERAPY 1/> REGIONS: CPT | Mod: GP

## 2025-05-07 ASSESSMENT — PAIN - FUNCTIONAL ASSESSMENT: PAIN_FUNCTIONAL_ASSESSMENT: 0-10

## 2025-05-07 NOTE — PROGRESS NOTES
Physical Therapy    Physical Therapy Treatment    Patient Name: Georgia Angel  MRN: [id number]  Today's Date: 2025    Time Entry:   Time Calculation  Start Time: 1035  Stop Time: 1130  Time Calculation (min): 55 min     PT Therapeutic Procedures Time Entry  Manual Therapy Time Entry: 25  Neuromuscular Re-Education Time Entry: 12  Therapeutic Exercise Time Entry: 10  Self-Care/Home Mgmt Trainin                    Insurance:  Visit: #3  Authorized: , ANTHEM, NO AUTH REQ, MED NEC VISITS, 100% COVERAGE  Payor/Plan: Payor: ANTHEM / Plan: ANTHEM HMP / Product Type: *No Product type* /     Current Problem  1. Muscle weakness        2. Pelvic pain affecting pregnancy in second trimester, antepartum ([facility])        3. Stress incontinence of urine              Assessment   Patient demonstrates tenderness of R piriformis on palpation. Difficulty with pelvic floor lengthening that improves with verbal review and practice. Patient will continue to benefit from skilled physical therapy to improve function and meet therapy goals.    Plan  Next Visit Plan: pp re-eval      Goals:  Patient will demonstrate independence and report adherence with home exercise program (HEP) to facilitate independent rehab program upon discharge to maximize functional gains.  Patient to report 75% improvement of leaking to demonstrate increased strength of pelvic floor musculature and improved bladder control.  Patient to coordinate thoracic diaphragm with pelvic floor musculature to improve bladder function and decrease leaks.  Patient will report 75% improvement in pain with carrying toddler  Patient to verbalize confidence in labor/delivery/post partum plan and preparation  Patient will improve bilateral lower extremity strength during manual muscle testing to 4+/5 for improved lumbopelvic stability    Subjective   Incontinence has been a lot better. Now is having a lot of pain in pelvic region. When going for walks, is in a lot of  pain    Has shooting pain in lower back down butt into upper thigh    Pain:  Pain Assessment: 0-10  0-10 (Numeric) Pain Score:  (Pain level not discussed)           Objective   Additional Verbalized Informed Consent Explained by Treating Therapist: Yes  Patient Understanding of Examination Techniques:  The patient has been informed about the various examination techniques to be utilized today, including both external and internal methods. A detailed explanation of the purpose and benefits of these techniques has been provided, ensuring the patient has a clear understanding of each aspect.    Consent and Autonomy:  The patient understands that consent for the examination is an ongoing process. They have the right to withdraw consent and terminate the examination at any point should they feel uncomfortable or wish to discontinue for any reason.  The patient acknowledges the importance of communication during the examination and agrees to promptly inform the examiner if they experience any discomfort at any time. This will allow for immediate adjustments to be made to ensure their comfort and safety.    Alternatives to Internal Examination:  The patient is aware of alternative options to internal examinations, includin. Education and Instruction: Providing information and guidance without performing an internal exam.  2. External Examination: Conducting a pelvic exam while the patient remains clothed or partially clothed.  3. No Examination: The option to forgo the examination entirely if the patient chooses.  The patient has confirmed their understanding of these alternatives and their implications.    Conclusion:  The patient’s consent is based on a thorough understanding of the risks, benefits, and purposes of the examination techniques discussed today. They have been encouraged to ask questions and express any concerns they may have, ensuring an informed and collaborative approach to their care.    Patient  does not want to do internal work    Palpation: tenderness R piriformis    TREATMENT    Manual therapy:  Patient in L sidelying  Soft tissue mobilization R piriformis  Instruction on how to perform self piriformis release at home with ball on wall  Patient supine hooklying  Application of kinesio tape for belly support during pregnancy - instructions provided how long to keep on, how to remove, how to apply at home if helpful    Neuromuscular reeducation  Pushing and role of pelvic floor  - verbally reviewed diaphragmatic breathing/lengthening in supine hooklying    Therapeutic exercise:  Labor prep/mobility flow - cat/cow to child's pose with hips internal rotation x10, hip internal rotation on yoga block bilateral x10, quadruped thread the needle bilateral x10  Resources provided to find mobility flows on internet      Self-care/home management:  Round ligament pain after walking - can use stretch or massage to help  Review of postpartum plan, set up appointments      Education:  Home exercise program: Reviewed and educated patient on additions/changes for home exercise program as above (*)     Access Code: [email or web address]  URL: [email or web address]  Date: 05/07/2025  Prepared by: Bel Ashley    Exercises  - Clamshell  - 1 x daily - 7 x weekly - 1-2 sets - 10 reps  - Sidelying Reverse Clamshell  - 1 x daily - 7 x weekly - 1-2 sets - 10 reps  - Sidelying Hip Flexion and Abduction with Internal Rotation  - 1 x daily - 7 x weekly - 1-2 sets - 10 reps  - Reverse Clamshell in Extension and Abduction  - 1 x daily - 7 x weekly - 1-2 sets - 10 reps  - Supine Bridge with Mini Swiss Ball Between Knees  - 1 x daily - 7 x weekly - 2 sets - 10 reps  - Seated Hip Flexor Stretch  - 3 x daily - 7 x weekly - 1 sets - 1 min hold  - Supine Transversus Abdominis Bracing with Pelvic Floor Contraction  - 1 x daily - 7 x weekly - 1 sets - 10 reps  - Bird Dog  - 1 x daily - 7 x weekly - 2 sets - 10 reps  -  Diaphragmatic Breathing in Child's Pose with Pelvic Floor Relaxation  - 1 x daily - 7 x weekly - 1 sets - 10 reps  - Supine Diaphragmatic Breathing with Pelvic Floor Lengthening  - 2 x daily - 7 x weekly - 1 sets - 10 reps  - Seated Pelvic Floor Lengthening  - 1 x daily - 7 x weekly - 1 sets - 10 reps  - Box Lift from Ground with Pelvic Floor Contraction   - Standing Piriformis Release with Ball at Wall  - 1 x daily - 7 x weekly - 1 sets    Bel Ashley, PT, DPT

## 2025-05-14 ENCOUNTER — APPOINTMENT (OUTPATIENT)
Dept: OBSTETRICS AND GYNECOLOGY | Facility: CLINIC | Age: 30
End: 2025-05-14
Payer: COMMERCIAL

## 2025-05-14 VITALS — SYSTOLIC BLOOD PRESSURE: 107 MMHG | DIASTOLIC BLOOD PRESSURE: 71 MMHG | BODY MASS INDEX: 27.96 KG/M2 | WEIGHT: 168 LBS

## 2025-05-14 DIAGNOSIS — Z3A.31 31 WEEKS GESTATION OF PREGNANCY (HHS-HCC): Primary | ICD-10-CM

## 2025-05-14 PROCEDURE — 0501F PRENATAL FLOW SHEET: CPT | Performed by: OBSTETRICS & GYNECOLOGY

## 2025-05-29 ENCOUNTER — APPOINTMENT (OUTPATIENT)
Dept: OBSTETRICS AND GYNECOLOGY | Facility: CLINIC | Age: 30
End: 2025-05-29
Payer: COMMERCIAL

## 2025-05-29 VITALS — SYSTOLIC BLOOD PRESSURE: 105 MMHG | WEIGHT: 171 LBS | BODY MASS INDEX: 28.46 KG/M2 | DIASTOLIC BLOOD PRESSURE: 71 MMHG

## 2025-05-29 DIAGNOSIS — Z3A.33 33 WEEKS GESTATION OF PREGNANCY (HHS-HCC): Primary | ICD-10-CM

## 2025-05-29 PROCEDURE — 0501F PRENATAL FLOW SHEET: CPT | Performed by: OBSTETRICS & GYNECOLOGY

## 2025-05-29 NOTE — PROGRESS NOTES
Ob Visit  25     SUBJECTIVE      HPI: Georgia Angel is a 29 y.o.  at 33w3d here for RPNV.  Does not endorse contractions, leakage of fluid or vaginal bleeding.  Endorses good fetal movement.   Concerned baby is breech having lots of low movement.     OBJECTIVE  Visit Vitals  /71   Wt 77.6 kg (171 lb)   BMI 28.46 kg/m²   OB Status Pregnant   Smoking Status Never   BSA 1.89 m²      BSUS- cephalic, reassured pt      ASSESSMENT & PLAN    Georgia Angel is a 29 y.o.  at 33w3d here for the following concerns we addressed today:    Problem List Items Addressed This Visit       33 weeks gestation of pregnancy ([facility]) - Primary    Overview   Desired provider in labor: [] CNM  [x] Physician  [x] Blood Products: [x] Yes, accepts [] No, needs counseling  [x] Initial BMI: Could not be calculated   [x] Prenatal Labs:  wnl  [] Cervical Cancer Screening up to date  [x] Rh status: A+  [x] Genetic Screening:  risk reducing, its a boy  [x] NT US: (11-13 wks) normal  [] Baby ASA (if indicated):   [x] Pregnancy dated by: 6 wk office US (no LMP after recent miscarriage)    [x] Anatomy US: (19-20 wks) wnl  [] Federal Sterilization consent signed (if indicated):  [x] 1hr GCT at 24-28wks: wnl  [] Fetal Surveillance (if indicated):  [x] Tdap (27-32 wks, may be given up to 36 wks if initial window missed): given 25  [] RSV (32-36 wks) (Sept. to end of ):   [x] Flu Vaccine: given   Recommended updated covid booster    [] Breastfeeding:  [] Postpartum Birth control method:   [] GBS at 36 - 37 wks:  [] 39 weeks discussion of IOL vs. Expectant management:  [] Mode of delivery ( anticipated ): vaginal                RTC in 2 weeks      Terese Orozco MD

## 2025-06-04 ENCOUNTER — OFFICE VISIT (OUTPATIENT)
Dept: URGENT CARE | Age: 30
End: 2025-06-04
Payer: COMMERCIAL

## 2025-06-04 VITALS
OXYGEN SATURATION: 99 % | SYSTOLIC BLOOD PRESSURE: 106 MMHG | WEIGHT: 171 LBS | HEART RATE: 100 BPM | BODY MASS INDEX: 28.46 KG/M2 | DIASTOLIC BLOOD PRESSURE: 78 MMHG | TEMPERATURE: 97.7 F | RESPIRATION RATE: 20 BRPM

## 2025-06-04 DIAGNOSIS — R05.9 COUGH, UNSPECIFIED TYPE: ICD-10-CM

## 2025-06-04 LAB
POC CORONAVIRUS SARS-COV-2 PCR: NEGATIVE
POC HUMAN RHINOVIRUS PCR: POSITIVE
POC INFLUENZA A VIRUS PCR: NEGATIVE
POC INFLUENZA B VIRUS PCR: NEGATIVE
POC RESPIRATORY SYNCYTIAL VIRUS PCR: NEGATIVE

## 2025-06-04 NOTE — PATIENT INSTRUCTIONS
A rapid PCR test was performed today including tests for Influenza A, influenza B, RSV, rhinovirus (common cold virus), Covid-19.  The results were: Positive for rhinovirus      You have an upper respiratory infection. Mostly, these are caused by viruses and treatment is as follows. Symptoms may last for up to 2-3 weeks, but follow up with PCP if your symptoms start worsening.  Due to your pregnancy, you will may only take a few medications listed below.    For muscle aches, fever, chills: Acetaminophen or Tylenol can be used.  Hydration: Maintain adequate hydration with water.  Nasal symptoms: Nasal Saline available over-the-counter, can be used 3-4 times per day  Decongestants reduce nasal congestion and discharge: Zyrtec or Benadryl  Vaseline at opening of nares may reduce irritation   Cool Mist Humidifier may loosens discharge  Cough Suppressants or expectorants may be taken over-the-counter, including guaifenesin based medication such as Mucinex or Robitussin, or dextromethorphan based medication such as Robitussin-DM  Use medicated cough drops or cough syrup called Cepacol    Antibiotics are not indicated for treatment.

## 2025-06-04 NOTE — PROGRESS NOTES
Subjective   Patient ID: Georgia Angel is a 29 y.o. female. They present today with a chief complaint of Illness (Chest congestion 3 days).    Patient disposition: Home    History of Present Illness  HPI  34-week pregnant patient presents with cough and cold symptoms for the past 3 days.  Runny nose, postnasal drip.  Yesterday started feeling more chest congestion.  No wheezing or tightness.  No history of asthma or bronchitis.  No medications taken.  No sick contacts.  No other complaints.      Past Medical History  Allergies as of 06/04/2025 - Reviewed 06/04/2025   Allergen Reaction Noted    Penicillins Unknown 08/26/2023       Prescriptions Prior to Admission[1]     Current Medications[2]    Problem List[3]    Surgical History[4]     reports that she has never smoked. She has never used smokeless tobacco. She reports that she does not currently use alcohol. She reports that she does not use drugs.    Review of Systems  As noted in HPI. ROS otherwise negative unless noted.       Objective    Vitals:    06/04/25 0917   BP: 106/78   Pulse: 100   Resp: 20   Temp: 36.5 °C (97.7 °F)   SpO2: 99%   Weight: 77.6 kg (171 lb)     No LMP recorded. Patient is pregnant.    Physical Exam  Constitutional: vital signs reviewed. Well developed, well nourished. patient alert and patient without distress.   Head and Face: Normal and atraumatic.  Palpation of the face and sinuses: Normal.  Ears, Nose, Mouth, and Throat:   Hearing: Normal.  External inspection of nose: Normal.   Lips, teeth, tongue and gums: Normal and well hydrated. External inspection of ears: Normal. Ear canals and TMs: Normal.  Posterior pharynx moist, no exudate, symmetric, no abscess, and with post nasal drip.  Nasal mucosa: Congested  Lymphatic: No cervical lymphadenopathy  Neck: No neck mass was observed. Supple. normal muscle tone.   Cardiovascular: Heart rate normal, normal S1 and S2, no gallops, no murmurs and no pericardial rub. Rhythm:  Normal.  Pulmonary: No respiratory distress. Palpation of chest: Normal. Clear bilateral breath sounds.   Psych: Normal mood and affect        Procedures    Point of Care Test & Imaging Results from this visit  Results for orders placed or performed in visit on 06/04/25   POCT SPOTFIRE R/ST Panel Mini w/COVID ([facility]) manually resulted    Collection Time: 06/04/25  9:44 AM    Specimen: Swab   Result Value Ref Range    POC Sars-Cov-2 PCR Negative Negative    POC Respiratory Syncytial Virus PCR Negative Negative    POC Influenza A Virus PCR Negative Negative    POC Influenza B Virus PCR Negative Negative    POC Human Rhinovirus PCR Positive (A) Negative            Diagnostic study results (if any) were reviewed.  (If applicable) preliminary radiology reading: None    Assessment/Plan   Allergies, medications, history, and pertinent labs/EKGs/Imaging reviewed.        Medical Decision Making  See note    Orders and Diagnoses  Diagnoses and all orders for this visit:  Cough, unspecified type  -     POCT SPOTFIRE R/ST Panel Mini w/COVID ([facility]) manually resulted      Medical Admin Record      Follow Up Instructions  No follow-ups on file.    At time of discharge patient was clinically well-appearing and HDS for outpatient management. The patient and/or family was educated regarding diagnosis, supportive care, OTC and Rx medications. The patient and/or family was given the opportunity to ask questions prior to discharge and all questions answered. They verbalized understanding of my discussion of the plans for treatment, expected course, indications to return to  or seek further evaluation in ED, and the need for timely follow up as directed.      Electronically signed by [facility]           [1] (Not in a hospital admission)   [2]   Current Outpatient Medications   Medication Sig Dispense Refill    prenatal no115/iron/folic acid (PRENATAL 19 ORAL) Take by mouth.       No current facility-administered  medications for this visit.   [3]   Patient Active Problem List  Diagnosis    33 weeks gestation of pregnancy ([facility])    Muscle weakness    Pelvic pain affecting pregnancy in second trimester, antepartum ([facility])    Stress incontinence of urine   [4]   Past Surgical History:  Procedure Laterality Date    MR HEAD ANGIO WO IV CONTRAST  9/21/2023    MR HEAD ANGIO WO IV CONTRAST 9/21/2023 [facility]

## 2025-06-11 ENCOUNTER — APPOINTMENT (OUTPATIENT)
Dept: OBSTETRICS AND GYNECOLOGY | Facility: CLINIC | Age: 30
End: 2025-06-11
Payer: COMMERCIAL

## 2025-06-11 VITALS — SYSTOLIC BLOOD PRESSURE: 119 MMHG | BODY MASS INDEX: 28.62 KG/M2 | DIASTOLIC BLOOD PRESSURE: 76 MMHG | WEIGHT: 172 LBS

## 2025-06-11 DIAGNOSIS — Z3A.35 35 WEEKS GESTATION OF PREGNANCY (HHS-HCC): Primary | ICD-10-CM

## 2025-06-11 PROCEDURE — 0501F PRENATAL FLOW SHEET: CPT | Performed by: OBSTETRICS & GYNECOLOGY

## 2025-06-16 ENCOUNTER — APPOINTMENT (OUTPATIENT)
Dept: OBSTETRICS AND GYNECOLOGY | Facility: CLINIC | Age: 30
End: 2025-06-16
Payer: COMMERCIAL

## 2025-06-16 VITALS — SYSTOLIC BLOOD PRESSURE: 116 MMHG | DIASTOLIC BLOOD PRESSURE: 73 MMHG | BODY MASS INDEX: 28.79 KG/M2 | WEIGHT: 173 LBS

## 2025-06-16 DIAGNOSIS — Z34.83 PRENATAL CARE, SUBSEQUENT PREGNANCY IN THIRD TRIMESTER: ICD-10-CM

## 2025-06-16 DIAGNOSIS — Z3A.36 36 WEEKS GESTATION OF PREGNANCY (HHS-HCC): Primary | ICD-10-CM

## 2025-06-16 PROCEDURE — 0501F PRENATAL FLOW SHEET: CPT | Performed by: OBSTETRICS & GYNECOLOGY

## 2025-06-16 NOTE — PROGRESS NOTES
Routine ob at 36.0 wks.  Feeling well. Good FM.  GBS done (PCN allergy was n/v as a child so can get ancef if positive).  SVE FT/long/high, vertex.  Desires spontaneous labor.  RTC 1 wk.

## 2025-06-18 ENCOUNTER — LAB REQUISITION (OUTPATIENT)
Dept: LAB | Facility: HOSPITAL | Age: 30
End: 2025-06-18

## 2025-06-18 ENCOUNTER — ROUTINE PRENATAL (OUTPATIENT)
Dept: OBSTETRICS AND GYNECOLOGY | Facility: CLINIC | Age: 30
End: 2025-06-18
Payer: COMMERCIAL

## 2025-06-18 ENCOUNTER — TELEPHONE (OUTPATIENT)
Dept: OBSTETRICS AND GYNECOLOGY | Facility: CLINIC | Age: 30
End: 2025-06-18

## 2025-06-18 VITALS — WEIGHT: 174 LBS | SYSTOLIC BLOOD PRESSURE: 114 MMHG | DIASTOLIC BLOOD PRESSURE: 80 MMHG | BODY MASS INDEX: 28.96 KG/M2

## 2025-06-18 DIAGNOSIS — R51.9 ACUTE NONINTRACTABLE HEADACHE, UNSPECIFIED HEADACHE TYPE: ICD-10-CM

## 2025-06-18 DIAGNOSIS — R51.9 HEADACHE, UNSPECIFIED: ICD-10-CM

## 2025-06-18 DIAGNOSIS — Z3A.36 36 WEEKS GESTATION OF PREGNANCY (HHS-HCC): Primary | ICD-10-CM

## 2025-06-18 LAB
ALBUMIN SERPL BCP-MCNC: 3.4 G/DL (ref 3.4–5)
ALP SERPL-CCNC: 95 U/L (ref 33–110)
ALT SERPL W P-5'-P-CCNC: 10 U/L (ref 7–45)
ANION GAP SERPL CALC-SCNC: 12 MMOL/L (ref 10–20)
AST SERPL W P-5'-P-CCNC: 13 U/L (ref 9–39)
BILIRUB SERPL-MCNC: 0.8 MG/DL (ref 0–1.2)
BUN SERPL-MCNC: 10 MG/DL (ref 6–23)
CALCIUM SERPL-MCNC: 8.9 MG/DL (ref 8.6–10.3)
CHLORIDE SERPL-SCNC: 104 MMOL/L (ref 98–107)
CO2 SERPL-SCNC: 25 MMOL/L (ref 21–32)
CREAT SERPL-MCNC: 0.41 MG/DL (ref 0.5–1.05)
CREAT UR-MCNC: 54 MG/DL (ref 20–320)
EGFRCR SERPLBLD CKD-EPI 2021: >90 ML/MIN/1.73M*2
ERYTHROCYTE [DISTWIDTH] IN BLOOD BY AUTOMATED COUNT: 12.2 % (ref 11.5–14.5)
GLUCOSE SERPL-MCNC: 115 MG/DL (ref 74–99)
HCT VFR BLD AUTO: 37.6 % (ref 36–46)
HGB BLD-MCNC: 12.6 G/DL (ref 12–16)
LDH SERPL L TO P-CCNC: 153 U/L (ref 84–246)
MCH RBC QN AUTO: 31.9 PG (ref 26–34)
MCHC RBC AUTO-ENTMCNC: 33.5 G/DL (ref 32–36)
MCV RBC AUTO: 95 FL (ref 80–100)
NRBC BLD-RTO: 0 /100 WBCS (ref 0–0)
PLATELET # BLD AUTO: 177 X10*3/UL (ref 150–450)
POTASSIUM SERPL-SCNC: 3.8 MMOL/L (ref 3.5–5.3)
PROT SERPL-MCNC: 6 G/DL (ref 6.4–8.2)
PROT UR-ACNC: 10 MG/DL (ref 5–24)
PROT/CREAT UR: 0.19 MG/MG CREAT (ref 0–0.17)
RBC # BLD AUTO: 3.95 X10*6/UL (ref 4–5.2)
SODIUM SERPL-SCNC: 137 MMOL/L (ref 136–145)
WBC # BLD AUTO: 10.8 X10*3/UL (ref 4.4–11.3)

## 2025-06-18 PROCEDURE — 84156 ASSAY OF PROTEIN URINE: CPT

## 2025-06-18 PROCEDURE — 82570 ASSAY OF URINE CREATININE: CPT

## 2025-06-18 PROCEDURE — 80053 COMPREHEN METABOLIC PANEL: CPT

## 2025-06-18 PROCEDURE — 85027 COMPLETE CBC AUTOMATED: CPT

## 2025-06-18 PROCEDURE — 0501F PRENATAL FLOW SHEET: CPT

## 2025-06-18 PROCEDURE — 83615 LACTATE (LD) (LDH) ENZYME: CPT

## 2025-06-18 RX ORDER — METOCLOPRAMIDE 10 MG/1
TABLET ORAL
Qty: 30 TABLET | Refills: 0 | Status: ON HOLD | OUTPATIENT
Start: 2025-06-18 | End: 2025-06-21 | Stop reason: ALTCHOICE

## 2025-06-18 NOTE — PROGRESS NOTES
Subjective     Georgia Angel is a 29 y.o.  at 36w2d with a working estimated date of delivery of 2025, by Ultrasound who presents for a problem visit. She denies vaginal bleeding, leakage of fluid, decreased fetal movements, or contractions. Patient endorses headache intermittently for the last 24 hours as well as left sided facial and arm numbness and blurry visit. Patient reports taking Tylenol at 1300 which has resolved arm and face numbness and blurry vision and improved HA, but did not completely resolve HA. . Patient rating HA 5/10 on the pain scale.  Patient reports adequate hydration at home. Patient reports that these symptoms also presented with her last pregnancy when she was suffering from a migraine. Patient denies any upper belly pain, chest pain, or increase in swelling.     Her pregnancy is complicated by:  Medical Problems       Problem List       36 weeks gestation of pregnancy ([facility])    Overview Addendum 2025  3:52 PM by Erendira Gilmore MD   Desired provider in labor: [] CNM  [x] Physician  [x] Blood Products: [x] Yes, accepts [] No, needs counseling  [x] Initial BMI: Could not be calculated   [x] Prenatal Labs:  wnl  [] Cervical Cancer Screening up to date  [x] Rh status: A+  [x] Genetic Screening:  risk reducing, its a boy  [x] NT US: (11-13 wks) normal  [] Baby ASA (if indicated):   [x] Pregnancy dated by: 6 wk office US (no LMP after recent miscarriage)    [x] Anatomy US: (19-20 wks) wnl  [] Federal Sterilization consent signed (if indicated):  [x] 1hr GCT at 24-28wks: wnl  [] Fetal Surveillance (if indicated):  [x] Tdap (27-32 wks, may be given up to 36 wks if initial window missed): given 25  [] RSV (32-36 wks) (Sept. to end of ):   [x] Flu Vaccine: given   Recommended updated covid booster    [x] Breastfeeding: yes  [] Postpartum Birth control method:   [] GBS at 36 - 37 wks: pending  [] 39 weeks discussion of IOL vs. Expectant management: exp management  for now  [] Mode of delivery ( anticipated ): vaginal           Muscle weakness    Pelvic pain affecting pregnancy in second trimester, antepartum ([facility])    Stress incontinence of urine          Objective   Weight: 78.9 kg (174 lb)  TW.2 kg (29 lb)  Pregravid BMI: 24.13    BP: 114/80          Prenatal Labs:  Lab Results   Component Value Date    HGB 12.2 2025    HCT 38.6 2025     2025    ABO A 2024    LABRH POS 2024    NEISSGONOAMP Negative 2024    CHLAMTRACAMP Negative 2024    SYPHT Nonreactive 2024    HEPBSAG Nonreactive 2024    HIV1X2 Nonreactive 2024    URINECULTURE No significant growth 2024       Assessment/Plan   Facial/arm numbness/blurred vision/HA: likely related to migraine. Consulted with Dr. Orozco who recommends lab work up to rule out atypical presentation of HDP. Orders placed for   CBC, LDH, CMP and urine protein.  Headache: patient instructed on  Reglan, benadryl, and tylenol use and rx sent to pharmacy. Patient encouraged to increase fluids with added electrolytes and drink a caffeinated beverage. Discussed with patient that if symptoms persist despite treatment that she should report to [facility] [place] for further evaluation.     Dr. Gilmore sent message to follow-up on outpatient lab work as she is on call this evening.       Follow up for  routine prenatal visit as scheduled.  DERRICK Tolliver

## 2025-06-18 NOTE — TELEPHONE ENCOUNTER
36.2 week patient called office with complaints of headache and blurred vision. Patient states that she has not taken tylenol. Denies any cramping, bleeding or leakage of fluid denies any upper right quadrant pain. Patient states the baby is moving okay. Patient was advised to take tylenol and drink a large glass of water and lay down. Will call back in an hour to see how she is feeling. Patient verbilised understanding.     Please advise?

## 2025-06-19 LAB — HOLD SPECIMEN: NORMAL

## 2025-06-20 LAB — GP B STREP SPEC QL CULT: NORMAL

## 2025-06-21 ENCOUNTER — HOSPITAL ENCOUNTER (OUTPATIENT)
Facility: HOSPITAL | Age: 30
Discharge: HOME | End: 2025-06-21
Attending: OBSTETRICS & GYNECOLOGY | Admitting: OBSTETRICS & GYNECOLOGY
Payer: COMMERCIAL

## 2025-06-21 ENCOUNTER — HOSPITAL ENCOUNTER (OUTPATIENT)
Facility: HOSPITAL | Age: 30
End: 2025-06-21
Attending: OBSTETRICS & GYNECOLOGY | Admitting: OBSTETRICS & GYNECOLOGY
Payer: COMMERCIAL

## 2025-06-21 VITALS
TEMPERATURE: 98.1 F | WEIGHT: 173.94 LBS | SYSTOLIC BLOOD PRESSURE: 108 MMHG | HEART RATE: 85 BPM | HEIGHT: 65 IN | RESPIRATION RATE: 16 BRPM | BODY MASS INDEX: 28.98 KG/M2 | DIASTOLIC BLOOD PRESSURE: 73 MMHG

## 2025-06-21 PROCEDURE — 99213 OFFICE O/P EST LOW 20 MIN: CPT | Performed by: OBSTETRICS & GYNECOLOGY

## 2025-06-21 PROCEDURE — 99214 OFFICE O/P EST MOD 30 MIN: CPT

## 2025-06-21 ASSESSMENT — PAIN SCALES - GENERAL: PAINLEVEL_OUTOF10: 0 - NO PAIN

## 2025-06-21 NOTE — H&P
OB Triage H&P    Assessment/Plan    Georgia Angel is a 29 y.o.  at 36w5d, KADEEM: 2025, by Ultrasound, who presents to triage with leakage of fluid after intercourse and mild contractions.    Plan    -Fetal monitoring reassuring  -Bedside MALU 19.86  -SSE neg for pooling and ferning, positive nitrazine as expected  -pt declines cervical exam but appears unchanged from office exam on 25 (closed)  -Good fetal movement  -Up to date on prenatal care  -Continue routine prenatal care    Dispo  -Patient appropriate for discharge home, agrees with plan  -Return precautions discussed   -Follow up at next scheduled OB appointment or to triage sooner as needed      Pregnancy Problems (from 24 to present)       Problem Noted Diagnosed Resolved    Pelvic pain affecting pregnancy in second trimester, antepartum ([facility]) 2025 by Bel Ashley, PT  No    Priority:  Medium       36 weeks gestation of pregnancy ([facility]) 2024 by Erendira Gilmore MD  No    Priority:  Medium       Overview Addendum 2025  3:52 PM by Erendira Gilmore MD   Desired provider in labor: [] CNM  [x] Physician  [x] Blood Products: [x] Yes, accepts [] No, needs counseling  [x] Initial BMI: Could not be calculated   [x] Prenatal Labs:  wnl  [] Cervical Cancer Screening up to date  [x] Rh status: A+  [x] Genetic Screening:  risk reducing, its a boy  [x] NT US: (11-13 wks) normal  [] Baby ASA (if indicated):   [x] Pregnancy dated by: 6 wk office US (no LMP after recent miscarriage)    [x] Anatomy US: (19-20 wks) wnl  [] Federal Sterilization consent signed (if indicated):  [x] 1hr GCT at 24-28wks: wnl  [] Fetal Surveillance (if indicated):  [x] Tdap (27-32 wks, may be given up to 36 wks if initial window missed): given 25  [] RSV (32-36 wks) (Sept. to end of ):   [x] Flu Vaccine: given   Recommended updated covid booster    [x] Breastfeeding: yes  [] Postpartum Birth control method:   [] GBS at 36 - 37  wks: pending  [] 39 weeks discussion of IOL vs. Expectant management: exp management for now  [] Mode of delivery ( anticipated ): vaginal                   Subjective   Good fetal movement.  Denies vaginal bleeding., Denies contractions., Denies leaking of fluid.      Prenatal Provider Dr. Gilmore    OB History    Para Term  AB Living   3 1 1 0 1 1   SAB IAB Ectopic Multiple Live Births   1 0 0 0 1      # Outcome Date GA Lbr Harry/2nd Weight Sex Type Anes PTL Lv   3 Current            2 SAB 24 7w0d    Complete      1 Term 10/06/23 39w3d 04:45 / 03:10 3.33 kg M Vag-Spont None  ANTOINE      Name: BABITA TORRES      Apgar1: 9  Apgar5: 9       Surgical History[1]    Social History     Tobacco Use    Smoking status: Never    Smokeless tobacco: Never   Substance Use Topics    Alcohol use: Not Currently       Allergies[2]    Prescriptions Prior to Admission[3]  Objective     Last Vitals  Temp Pulse Resp BP MAP O2 Sat   36.7 °C (98.1 °F) 85 16 108/73 85       Blood Pressures         2025  1345             BP: 108/73             Physical Exam  General: NAD, mood appropriate  Cardiopulmonary: warm and well perfused, breathing comfortably on room air  Abdomen: Gravid, non-tender  Extremities: Symmetric  Speculum Exam: no pooling of fluid seen, Nitrazine test is positive, Ferning test is negative  Cervix: 3 /50 /-2     Chaperone Present: Yes.  Chaperone Name/Title: RAFI Zelaya     Fetal Monitoring  Baseline: 135 bpm, Variability: moderate,  Accelerations: present and Decelerations: none  Uterine Activity: No contractions seen on toco  Interpretation: Reactive    Bedside ultrasound: Yes, MALU 19.86    Labs in chart were reviewed.   Results from last 7 days   Lab Units 25  1614   WBC AUTO x10*3/uL 10.8   HEMOGLOBIN g/dL 12.6   HEMATOCRIT % 37.6   PLATELETS AUTO x10*3/uL 177   AST U/L 13   ALT U/L 10   CREATININE mg/dL 0.41*        Prenatal labs reviewed, remarkable for GBS that is pending.             [1]    Past Surgical History:  Procedure Laterality Date    MR HEAD ANGIO WO IV CONTRAST  9/21/2023    MR HEAD ANGIO WO IV CONTRAST 9/21/2023 [facility]   [2]   Allergies  Allergen Reactions    Penicillins Unknown   [3]   Medications Prior to Admission   Medication Sig Dispense Refill Last Dose/Taking    metoclopramide (Reglan) 10 mg tablet Take one tablet every 6 hours as needed for HA. 30 tablet 0 Unknown    prenatal no115/iron/folic acid (PRENATAL 19 ORAL) Take by mouth.   6/21/2025

## 2025-06-25 ENCOUNTER — APPOINTMENT (OUTPATIENT)
Dept: OBSTETRICS AND GYNECOLOGY | Facility: CLINIC | Age: 30
End: 2025-06-25
Payer: COMMERCIAL

## 2025-06-25 VITALS — WEIGHT: 173 LBS | DIASTOLIC BLOOD PRESSURE: 73 MMHG | BODY MASS INDEX: 28.79 KG/M2 | SYSTOLIC BLOOD PRESSURE: 109 MMHG

## 2025-06-25 DIAGNOSIS — Z3A.37 37 WEEKS GESTATION OF PREGNANCY (HHS-HCC): Primary | ICD-10-CM

## 2025-06-25 DIAGNOSIS — D50.8 IRON DEFICIENCY ANEMIA SECONDARY TO INADEQUATE DIETARY IRON INTAKE: ICD-10-CM

## 2025-06-25 PROCEDURE — 0501F PRENATAL FLOW SHEET: CPT | Performed by: OBSTETRICS & GYNECOLOGY

## 2025-06-25 NOTE — PROGRESS NOTES
Ob Visit  25     SUBJECTIVE      HPI: Georgia Angel is a 29 y.o.  at 37w2d here for RPNV. Does not endorse contractions, leakage of fluid or vaginal bleeding.  Endorses good fetal movement.      OBJECTIVE  Visit Vitals  /73   Wt 78.5 kg (173 lb)   BMI 28.79 kg/m²   OB Status Pregnant   Smoking Status Never   BSA 1.9 m²            ASSESSMENT & PLAN    Georgia Angel is a 29 y.o.  at 37w2d here for the following concerns we addressed today:    Problem List Items Addressed This Visit       37 weeks gestation of pregnancy ([facility]) - Primary    Overview   Desired provider in labor: [] CNM  [x] Physician  [x] Blood Products: [x] Yes, accepts [] No, needs counseling  [x] Initial BMI: Could not be calculated   [x] Prenatal Labs:  wnl  [] Cervical Cancer Screening up to date  [x] Rh status: A+  [x] Genetic Screening:  risk reducing, its a boy  [x] NT US: (11-13 wks) normal  [] Baby ASA (if indicated):   [x] Pregnancy dated by: 6 wk office US (no LMP after recent miscarriage)    [x] Anatomy US: (19-20 wks) wnl  [] Federal Sterilization consent signed (if indicated):  [x] 1hr GCT at 24-28wks: wnl  [] Fetal Surveillance (if indicated):  [x] Tdap (27-32 wks, may be given up to 36 wks if initial window missed): given 25  [] RSV (32-36 wks) (Sept. to end ):   [x] Flu Vaccine: given   Recommended updated covid booster    [x] Breastfeeding: yes  [] Postpartum Birth control method:   [x] GBS at 36 - 37 wks: negative  [x] 39 weeks discussion of IOL vs. Expectant management: exp management for now  [x] Mode of delivery ( anticipated ): vaginal           RESOLVED: Anemia    Overview   On iron              RTC in 1 weeks, labor precautions      Terese Orozco MD

## 2025-07-02 ENCOUNTER — APPOINTMENT (OUTPATIENT)
Dept: OBSTETRICS AND GYNECOLOGY | Facility: CLINIC | Age: 30
End: 2025-07-02
Payer: COMMERCIAL

## 2025-07-02 VITALS — DIASTOLIC BLOOD PRESSURE: 81 MMHG | BODY MASS INDEX: 29.12 KG/M2 | SYSTOLIC BLOOD PRESSURE: 128 MMHG | WEIGHT: 175 LBS

## 2025-07-02 DIAGNOSIS — O36.8190 DECREASED FETAL MOVEMENT DURING PREGNANCY, ANTEPARTUM, SINGLE OR UNSPECIFIED FETUS (HHS-HCC): Primary | ICD-10-CM

## 2025-07-02 DIAGNOSIS — Z3A.38 38 WEEKS GESTATION OF PREGNANCY (HHS-HCC): ICD-10-CM

## 2025-07-02 PROCEDURE — 0501F PRENATAL FLOW SHEET: CPT | Performed by: OBSTETRICS & GYNECOLOGY

## 2025-07-02 PROCEDURE — 59025 FETAL NON-STRESS TEST: CPT | Performed by: OBSTETRICS & GYNECOLOGY

## 2025-07-02 NOTE — PROGRESS NOTES
Ob Visit  25     SUBJECTIVE      HPI: Georgia Angel is a 29 y.o.  at 38w2d here for RPNV.  Occ ctx, no leakage of fluid or vaginal bleeding, lots of mucus discharge.  Less movement during day.     OBJECTIVE  Visit Vitals  /81   Wt 79.4 kg (175 lb)   BMI 29.12 kg/m²   OB Status Pregnant   Smoking Status Never   BSA 1.91 m²            ASSESSMENT & PLAN    Georgia Angel is a 29 y.o.  at 38w2d here for the following concerns we addressed today:    Problem List Items Addressed This Visit       38 weeks gestation of pregnancy ([facility])    Overview   Desired provider in labor: [] CNM  [x] Physician  [x] Blood Products: [x] Yes, accepts [] No, needs counseling  [x] Initial BMI: Could not be calculated   [x] Prenatal Labs:  wnl  [] Cervical Cancer Screening up to date  [x] Rh status: A+  [x] Genetic Screening:  risk reducing, its a boy  [x] NT US: (11-13 wks) normal  [] Baby ASA (if indicated):   [x] Pregnancy dated by: 6 wk office US (no LMP after recent miscarriage)    [x] Anatomy US: (19-20 wks) wnl  [] Federal Sterilization consent signed (if indicated):  [x] 1hr GCT at 24-28wks: wnl  [] Fetal Surveillance (if indicated):  [x] Tdap (27-32 wks, may be given up to 36 wks if initial window missed): given 25  [] RSV (32-36 wks) (Sept. to end of ):   [x] Flu Vaccine: given   Recommended updated covid booster    [x] Breastfeeding: yes  [] Postpartum Birth control method:   [x] GBS at 36 - 37 wks: negative  [x] 39 weeks discussion of IOL vs. Expectant management: exp management for now  [x] Mode of delivery ( anticipated ): vaginal           Decreased fetal movement during pregnancy, antepartum ([facility]) - Primary    Current Assessment & Plan   MALU 11  NST reactive  Discussed kick counts              RTC in 1 weeks, labor precautions      Terese Orozco MD

## 2025-07-02 NOTE — PROCEDURES
Georgia Angel, a  at 38w2d with an KADEEM of 2025, by Ultrasound, was seen at [facility] for a nonstress test.    Non-Stress Test   Baseline Fetal Heart Rate for Non-Stress Test: 150 BPM  Variability in Waveform for Non-Stress Test: Moderate  Accelerations in Non-Stress Test: Yes, greater than/equal to 15 bpm, lasting at least 15 seconds  Decelerations in Non-Stress Test: None  Contractions in Non-Stress Test: Not present  Acoustic Stimulator for Non-Stress Test: Yes  Interpretation of Non-Stress Test   Interpretation of Non-Stress Test: Reactive

## 2025-07-07 ENCOUNTER — ANESTHESIA (OUTPATIENT)
Dept: OBSTETRICS AND GYNECOLOGY | Facility: HOSPITAL | Age: 30
End: 2025-07-07
Payer: COMMERCIAL

## 2025-07-07 ENCOUNTER — ANESTHESIA EVENT (OUTPATIENT)
Dept: OBSTETRICS AND GYNECOLOGY | Facility: HOSPITAL | Age: 30
End: 2025-07-07
Payer: COMMERCIAL

## 2025-07-07 ENCOUNTER — APPOINTMENT (OUTPATIENT)
Dept: OBSTETRICS AND GYNECOLOGY | Facility: CLINIC | Age: 30
End: 2025-07-07
Payer: COMMERCIAL

## 2025-07-07 ENCOUNTER — HOSPITAL ENCOUNTER (INPATIENT)
Facility: HOSPITAL | Age: 30
LOS: 2 days | Discharge: HOME | End: 2025-07-09
Attending: OBSTETRICS & GYNECOLOGY
Payer: COMMERCIAL

## 2025-07-07 VITALS — BODY MASS INDEX: 28.96 KG/M2 | SYSTOLIC BLOOD PRESSURE: 127 MMHG | DIASTOLIC BLOOD PRESSURE: 83 MMHG | WEIGHT: 174 LBS

## 2025-07-07 DIAGNOSIS — Z3A.39 39 WEEKS GESTATION OF PREGNANCY (HHS-HCC): Primary | ICD-10-CM

## 2025-07-07 DIAGNOSIS — O36.8130 DECREASED FETAL MOVEMENTS IN THIRD TRIMESTER, SINGLE OR UNSPECIFIED FETUS (HHS-HCC): ICD-10-CM

## 2025-07-07 DIAGNOSIS — R52 POSTPARTUM PAIN (HHS-HCC): Primary | ICD-10-CM

## 2025-07-07 PROBLEM — Z34.90 ENCOUNTER FOR INDUCTION OF LABOR: Status: ACTIVE | Noted: 2025-07-07

## 2025-07-07 LAB
ABO GROUP (TYPE) IN BLOOD: NORMAL
ANTIBODY SCREEN: NORMAL
ERYTHROCYTE [DISTWIDTH] IN BLOOD BY AUTOMATED COUNT: 12.3 % (ref 11.5–14.5)
HCT VFR BLD AUTO: 37.4 % (ref 36–46)
HGB BLD-MCNC: 12.7 G/DL (ref 12–16)
MCH RBC QN AUTO: 31.9 PG (ref 26–34)
MCHC RBC AUTO-ENTMCNC: 34 G/DL (ref 32–36)
MCV RBC AUTO: 94 FL (ref 80–100)
NRBC BLD-RTO: 0 /100 WBCS (ref 0–0)
PLATELET # BLD AUTO: 151 X10*3/UL (ref 150–450)
RBC # BLD AUTO: 3.98 X10*6/UL (ref 4–5.2)
RH FACTOR (ANTIGEN D): NORMAL
WBC # BLD AUTO: 11.7 X10*3/UL (ref 4.4–11.3)

## 2025-07-07 PROCEDURE — 0501F PRENATAL FLOW SHEET: CPT | Performed by: OBSTETRICS & GYNECOLOGY

## 2025-07-07 PROCEDURE — 7210000002 HC LABOR PER HOUR

## 2025-07-07 PROCEDURE — 36415 COLL VENOUS BLD VENIPUNCTURE: CPT

## 2025-07-07 PROCEDURE — 59025 FETAL NON-STRESS TEST: CPT | Performed by: OBSTETRICS & GYNECOLOGY

## 2025-07-07 PROCEDURE — 1120000001 HC OB PRIVATE ROOM DAILY

## 2025-07-07 PROCEDURE — 2500000004 HC RX 250 GENERAL PHARMACY W/ HCPCS (ALT 636 FOR OP/ED)

## 2025-07-07 PROCEDURE — 86780 TREPONEMA PALLIDUM: CPT | Mod: STJLAB

## 2025-07-07 PROCEDURE — 59050 FETAL MONITOR W/REPORT: CPT

## 2025-07-07 PROCEDURE — 86850 RBC ANTIBODY SCREEN: CPT

## 2025-07-07 PROCEDURE — 85027 COMPLETE CBC AUTOMATED: CPT

## 2025-07-07 RX ORDER — LIDOCAINE HYDROCHLORIDE 10 MG/ML
20 INJECTION, SOLUTION INFILTRATION; PERINEURAL ONCE AS NEEDED
Status: DISCONTINUED | OUTPATIENT
Start: 2025-07-07 | End: 2025-07-08

## 2025-07-07 RX ORDER — LOPERAMIDE HYDROCHLORIDE 2 MG/1
4 CAPSULE ORAL EVERY 2 HOUR PRN
Status: DISCONTINUED | OUTPATIENT
Start: 2025-07-07 | End: 2025-07-08

## 2025-07-07 RX ORDER — CALCIUM CARBONATE 200(500)MG
1 TABLET,CHEWABLE ORAL EVERY 6 HOURS PRN
Status: DISCONTINUED | OUTPATIENT
Start: 2025-07-07 | End: 2025-07-08

## 2025-07-07 RX ORDER — HYDRALAZINE HYDROCHLORIDE 20 MG/ML
5 INJECTION INTRAMUSCULAR; INTRAVENOUS ONCE AS NEEDED
Status: DISCONTINUED | OUTPATIENT
Start: 2025-07-07 | End: 2025-07-08

## 2025-07-07 RX ORDER — FENTANYL/ROPIVACAINE/NS/PF 2MCG/ML-.2
0-25 PLASTIC BAG, INJECTION (ML) INJECTION CONTINUOUS
Refills: 0 | Status: DISCONTINUED | OUTPATIENT
Start: 2025-07-07 | End: 2025-07-08

## 2025-07-07 RX ORDER — TRANEXAMIC ACID 1 G/10ML
1000 INJECTION, SOLUTION INTRAVENOUS ONCE AS NEEDED
Status: DISCONTINUED | OUTPATIENT
Start: 2025-07-07 | End: 2025-07-08

## 2025-07-07 RX ORDER — OXYTOCIN/0.9 % SODIUM CHLORIDE 30/500 ML
2-30 PLASTIC BAG, INJECTION (ML) INTRAVENOUS CONTINUOUS
Status: DISCONTINUED | OUTPATIENT
Start: 2025-07-07 | End: 2025-07-08

## 2025-07-07 RX ORDER — ONDANSETRON 4 MG/1
4 TABLET, FILM COATED ORAL EVERY 6 HOURS PRN
Status: DISCONTINUED | OUTPATIENT
Start: 2025-07-07 | End: 2025-07-08

## 2025-07-07 RX ORDER — TERBUTALINE SULFATE 1 MG/ML
0.25 INJECTION SUBCUTANEOUS ONCE AS NEEDED
Status: DISCONTINUED | OUTPATIENT
Start: 2025-07-07 | End: 2025-07-08

## 2025-07-07 RX ORDER — OXYTOCIN/0.9 % SODIUM CHLORIDE 30/500 ML
60 PLASTIC BAG, INJECTION (ML) INTRAVENOUS ONCE AS NEEDED
Status: DISCONTINUED | OUTPATIENT
Start: 2025-07-07 | End: 2025-07-08

## 2025-07-07 RX ORDER — MISOPROSTOL 200 UG/1
800 TABLET ORAL ONCE AS NEEDED
Status: DISCONTINUED | OUTPATIENT
Start: 2025-07-07 | End: 2025-07-08

## 2025-07-07 RX ORDER — SODIUM CHLORIDE, SODIUM LACTATE, POTASSIUM CHLORIDE, CALCIUM CHLORIDE 600; 310; 30; 20 MG/100ML; MG/100ML; MG/100ML; MG/100ML
125 INJECTION, SOLUTION INTRAVENOUS CONTINUOUS
Status: DISCONTINUED | OUTPATIENT
Start: 2025-07-07 | End: 2025-07-08

## 2025-07-07 RX ORDER — CARBOPROST TROMETHAMINE 250 UG/ML
250 INJECTION, SOLUTION INTRAMUSCULAR ONCE AS NEEDED
Status: DISCONTINUED | OUTPATIENT
Start: 2025-07-07 | End: 2025-07-08

## 2025-07-07 RX ORDER — OXYTOCIN 10 [USP'U]/ML
10 INJECTION, SOLUTION INTRAMUSCULAR; INTRAVENOUS ONCE AS NEEDED
Status: DISCONTINUED | OUTPATIENT
Start: 2025-07-07 | End: 2025-07-08

## 2025-07-07 RX ORDER — METHYLERGONOVINE MALEATE 0.2 MG/ML
0.2 INJECTION INTRAVENOUS ONCE AS NEEDED
Status: DISCONTINUED | OUTPATIENT
Start: 2025-07-07 | End: 2025-07-08

## 2025-07-07 RX ORDER — LABETALOL HYDROCHLORIDE 5 MG/ML
20 INJECTION, SOLUTION INTRAVENOUS ONCE AS NEEDED
Status: DISCONTINUED | OUTPATIENT
Start: 2025-07-07 | End: 2025-07-08

## 2025-07-07 RX ORDER — ONDANSETRON HYDROCHLORIDE 2 MG/ML
4 INJECTION, SOLUTION INTRAVENOUS EVERY 6 HOURS PRN
Status: DISCONTINUED | OUTPATIENT
Start: 2025-07-07 | End: 2025-07-08

## 2025-07-07 RX ADMIN — SODIUM CHLORIDE, SODIUM LACTATE, POTASSIUM CHLORIDE, AND CALCIUM CHLORIDE 125 ML/HR: .6; .31; .03; .02 INJECTION, SOLUTION INTRAVENOUS at 18:03

## 2025-07-07 RX ADMIN — Medication 2 MILLI-UNITS/MIN: at 18:04

## 2025-07-07 SDOH — SOCIAL STABILITY: SOCIAL INSECURITY: HAS ANYONE EVER THREATENED TO HURT YOUR FAMILY OR YOUR PETS?: NO

## 2025-07-07 SDOH — HEALTH STABILITY: MENTAL HEALTH: WERE YOU ABLE TO COMPLETE ALL THE BEHAVIORAL HEALTH SCREENINGS?: YES

## 2025-07-07 SDOH — HEALTH STABILITY: MENTAL HEALTH: SUICIDAL BEHAVIOR (LIFETIME): NO

## 2025-07-07 SDOH — SOCIAL STABILITY: SOCIAL INSECURITY: ARE YOU OR HAVE YOU BEEN THREATENED OR ABUSED PHYSICALLY, EMOTIONALLY, OR SEXUALLY BY ANYONE?: NO

## 2025-07-07 SDOH — SOCIAL STABILITY: SOCIAL INSECURITY: DO YOU FEEL ANYONE HAS EXPLOITED OR TAKEN ADVANTAGE OF YOU FINANCIALLY OR OF YOUR PERSONAL PROPERTY?: NO

## 2025-07-07 SDOH — SOCIAL STABILITY: SOCIAL INSECURITY: HAVE YOU HAD THOUGHTS OF HARMING ANYONE ELSE?: NO

## 2025-07-07 SDOH — HEALTH STABILITY: MENTAL HEALTH: WISH TO BE DEAD (PAST 1 MONTH): NO

## 2025-07-07 SDOH — SOCIAL STABILITY: SOCIAL INSECURITY: DOES ANYONE TRY TO KEEP YOU FROM HAVING/CONTACTING OTHER FRIENDS OR DOING THINGS OUTSIDE YOUR HOME?: NO

## 2025-07-07 SDOH — HEALTH STABILITY: MENTAL HEALTH: NON-SPECIFIC ACTIVE SUICIDAL THOUGHTS (PAST 1 MONTH): NO

## 2025-07-07 SDOH — HEALTH STABILITY: MENTAL HEALTH: HAVE YOU USED ANY SUBSTANCES (CANABIS, COCAINE, HEROIN, HALLUCINOGENS, INHALANTS, ETC.) IN THE PAST 12 MONTHS?: NO

## 2025-07-07 SDOH — SOCIAL STABILITY: SOCIAL INSECURITY: ABUSE SCREEN: ADULT

## 2025-07-07 SDOH — HEALTH STABILITY: MENTAL HEALTH: HAVE YOU USED ANY PRESCRIPTION DRUGS OTHER THAN PRESCRIBED IN THE PAST 12 MONTHS?: NO

## 2025-07-07 SDOH — SOCIAL STABILITY: SOCIAL INSECURITY: ARE THERE ANY APPARENT SIGNS OF INJURIES/BEHAVIORS THAT COULD BE RELATED TO ABUSE/NEGLECT?: NO

## 2025-07-07 SDOH — SOCIAL STABILITY: SOCIAL INSECURITY: VERBAL ABUSE: DENIES

## 2025-07-07 SDOH — ECONOMIC STABILITY: HOUSING INSECURITY: DO YOU FEEL UNSAFE GOING BACK TO THE PLACE WHERE YOU ARE LIVING?: NO

## 2025-07-07 SDOH — SOCIAL STABILITY: SOCIAL INSECURITY: HAVE YOU HAD ANY THOUGHTS OF HARMING ANYONE ELSE?: NO

## 2025-07-07 SDOH — SOCIAL STABILITY: SOCIAL INSECURITY: PHYSICAL ABUSE: DENIES

## 2025-07-07 ASSESSMENT — PAIN SCALES - GENERAL
PAINLEVEL_OUTOF10: 0 - NO PAIN
PAINLEVEL_OUTOF10: 4
PAINLEVEL_OUTOF10: 3
PAINLEVEL_OUTOF10: 0 - NO PAIN
PAINLEVEL_OUTOF10: 0 - NO PAIN
PAINLEVEL_OUTOF10: 3
PAINLEVEL_OUTOF10: 4
PAINLEVEL_OUTOF10: 2
PAINLEVEL_OUTOF10: 4
PAINLEVEL_OUTOF10: 2
PAINLEVEL_OUTOF10: 3
PAINLEVEL_OUTOF10: 0 - NO PAIN

## 2025-07-07 ASSESSMENT — LIFESTYLE VARIABLES
AUDIT-C TOTAL SCORE: 0
HOW MANY STANDARD DRINKS CONTAINING ALCOHOL DO YOU HAVE ON A TYPICAL DAY: PATIENT DOES NOT DRINK
HOW OFTEN DO YOU HAVE A DRINK CONTAINING ALCOHOL: NEVER
SKIP TO QUESTIONS 9-10: 1
HOW OFTEN DO YOU HAVE 6 OR MORE DRINKS ON ONE OCCASION: NEVER
AUDIT-C TOTAL SCORE: 0

## 2025-07-07 ASSESSMENT — PATIENT HEALTH QUESTIONNAIRE - PHQ9
2. FEELING DOWN, DEPRESSED OR HOPELESS: NOT AT ALL
SUM OF ALL RESPONSES TO PHQ9 QUESTIONS 1 & 2: 0
1. LITTLE INTEREST OR PLEASURE IN DOING THINGS: NOT AT ALL

## 2025-07-07 ASSESSMENT — ACTIVITIES OF DAILY LIVING (ADL): LACK_OF_TRANSPORTATION: NO

## 2025-07-07 NOTE — PROGRESS NOTES
Routine ob at 39.0 wks.  Feeling well. C/o decreased FM, this has continued since visit last week (had a normal MALU and reactive NST).  Almost called overnight but then baby started moving but still feels movement is overall decreased.  Discussed recommendation for IOL as she is 39 wks, she is amenable. NST done as patient wants to go home first and is reactive.  SVE 1.5/70/-1, vertex.  Patient to present to L&D for IOL for DFM shortly.  RTC for postpartum visit.

## 2025-07-07 NOTE — CARE PLAN
The patient's goals for the shift include Lowest intervention possible    The clinical goals for the shift include Lowest intervention possible      Problem: Vaginal Birth or  Section  Goal: Fetal and maternal status remain reassuring during the birth process  2025 by Ofelia Campo RN  Outcome: Progressing  2025 by Ofelia Capmo RN  Outcome: Progressing  Flowsheets (Taken 2025)  Fetal and maternal status remain reassuring during the birth process:   Monitor vital signs   Monitor fetal heart rate   Monitor labor progression (Vaginal delivery)   Med administration/monitoring of effect  Goal: Tolerate CRB for IOL placement maintenance until dislodgement/removal 12hrs after placement  2025 by Ofelia Campo RN  Outcome: Progressing  Flowsheets (Taken 2025)  Tolerates CRB for IOL placement/maintenance until dislodgement/removal 12hrs after placement: Fetal monitoring - continuous  2025 by Ofelia Campo RN  Outcome: Progressing  Flowsheets (Taken 2025)  Tolerates CRB for IOL placement/maintenance until dislodgement/removal 12hrs after placement: Fetal monitoring - continuous  Goal: Prevention of malpresentation/labor dystocia through delivery  2025 by Ofelia Campo RN  Outcome: Progressing  Flowsheets (Taken 2025)  Prevention of malpresentation/labor dystocia through delivery: Positioning, turning, and/or use of birthing ball assistance  2025 by Ofelia Campo RN  Outcome: Progressing  Flowsheets (Taken 2025)  Prevention of malpresentation/labor dystocia through delivery: Positioning, turning, and/or use of birthing ball assistance  Goal: Demonstrates labor coping techniques through delivery  2025 by Ofelia Campo RN  Outcome: Progressing  Flowsheets (Taken 2025)  Demonstrates labor coping techniques through delivery:   Positioning, turning, and/or use  of birthing ball assistance   Breathing/relaxation assistance  7/7/2025 1659 by Ofelia Campo RN  Outcome: Progressing  Flowsheets (Taken 7/7/2025 1659)  Demonstrates labor coping techniques through delivery:   Positioning, turning, and/or use of birthing ball assistance   Breathing/relaxation assistance  Goal: Minimal s/sx of HDP and BP<160/110  7/7/2025 1914 by Ofelia Campo RN  Outcome: Progressing  7/7/2025 1659 by Ofelia Campo RN  Outcome: Progressing  Flowsheets (Taken 7/7/2025 1659)  Minimal s/sx of HDP and BP <160/110:   Med administration/monitoring of effect   Monitor QBL and vital signs  Goal: No s/sx of infection through recovery  7/7/2025 1914 by Ofelia Campo RN  Outcome: Progressing  7/7/2025 1659 by Ofelia Campo RN  Outcome: Progressing  Flowsheets (Taken 7/7/2025 1659)  No s/sx of infection through recovery: Monitor QBL and vital signs  Goal: No s/sx of hemorrhage through recovery  7/7/2025 1914 by Ofelia Campo RN  Outcome: Progressing  Flowsheets (Taken 7/7/2025 1659)  No s/sx of hemorrhage through recovery: Monitor QBL and vital signs  7/7/2025 1659 by Ofelia Campo RN  Outcome: Progressing  Flowsheets (Taken 7/7/2025 1659)  No s/sx of hemorrhage through recovery: Monitor QBL and vital signs     Problem: Pain - Adult  Goal: Verbalizes/displays adequate comfort level or baseline comfort level  7/7/2025 1914 by Ofelia Campo RN  Outcome: Progressing  Flowsheets (Taken 7/7/2025 1659)  Verbalizes/displays adequate comfort level or baseline comfort level:   Encourage patient to monitor pain and request assistance   Assess pain using appropriate pain scale   Administer analgesics based on type and severity of pain and evaluate response   Implement non-pharmacological measures as appropriate and evaluate response   Consider cultural and social influences on pain and pain management   Notify Licensed Independent Practitioner if interventions  unsuccessful or patient reports new pain  7/7/2025 1659 by Ofelia Campo RN  Outcome: Progressing  Flowsheets (Taken 7/7/2025 1659)  Verbalizes/displays adequate comfort level or baseline comfort level:   Encourage patient to monitor pain and request assistance   Assess pain using appropriate pain scale   Administer analgesics based on type and severity of pain and evaluate response   Implement non-pharmacological measures as appropriate and evaluate response   Consider cultural and social influences on pain and pain management   Notify Licensed Independent Practitioner if interventions unsuccessful or patient reports new pain     Problem: Safety - Adult  Goal: Free from fall injury  7/7/2025 1914 by Ofelia Campo RN  Outcome: Progressing  Flowsheets (Taken 7/7/2025 1845)  Free from fall injury:   Instruct family/caregiver on patient safety   Based on caregiver fall risk screen, instruct family/caregiver to ask for assistance with transferring infant if caregiver noted to have fall risk factors  7/7/2025 1659 by Ofelia Campo RN  Outcome: Progressing     Problem: Discharge Planning  Goal: Discharge to home or other facility with appropriate resources  7/7/2025 1914 by Ofelia Campo RN  Outcome: Progressing  Flowsheets (Taken 7/7/2025 1845)  Discharge to home or other facility with appropriate resources:   Identify barriers to discharge with patient and caregiver   Identify discharge learning needs (meds, wound care, etc)   Refer to discharge planning if patient needs post-hospital services based on physician order or complex needs related to functional status, cognitive ability or social support system   Arrange for needed discharge resources and transportation as appropriate   Arrange for interpreters to assist at discharge as needed  7/7/2025 1659 by Ofelia Campo RN  Outcome: Progressing

## 2025-07-07 NOTE — CARE PLAN
The patient's goals for the shift include Lowest intervention possible    The clinical goals for the shift include Lowest intervention possible      Problem: Vaginal Birth or  Section  Goal: Fetal and maternal status remain reassuring during the birth process  Outcome: Progressing  Flowsheets (Taken 2025)  Fetal and maternal status remain reassuring during the birth process:   Monitor vital signs   Monitor fetal heart rate   Monitor labor progression (Vaginal delivery)   Med administration/monitoring of effect  Goal: Tolerate CRB for IOL placement maintenance until dislodgement/removal 12hrs after placement  Outcome: Progressing  Flowsheets (Taken 2025)  Tolerates CRB for IOL placement/maintenance until dislodgement/removal 12hrs after placement: Fetal monitoring - continuous  Goal: Prevention of malpresentation/labor dystocia through delivery  Outcome: Progressing  Flowsheets (Taken 2025)  Prevention of malpresentation/labor dystocia through delivery: Positioning, turning, and/or use of birthing ball assistance  Goal: Demonstrates labor coping techniques through delivery  Outcome: Progressing  Flowsheets (Taken 2025)  Demonstrates labor coping techniques through delivery:   Positioning, turning, and/or use of birthing ball assistance   Breathing/relaxation assistance  Goal: Minimal s/sx of HDP and BP<160/110  Outcome: Progressing  Flowsheets (Taken 2025)  Minimal s/sx of HDP and BP <160/110:   Med administration/monitoring of effect   Monitor QBL and vital signs  Goal: No s/sx of infection through recovery  Outcome: Progressing  Flowsheets (Taken 2025)  No s/sx of infection through recovery: Monitor QBL and vital signs  Goal: No s/sx of hemorrhage through recovery  Outcome: Progressing  Flowsheets (Taken 2025)  No s/sx of hemorrhage through recovery: Monitor QBL and vital signs     Problem: Pain - Adult  Goal: Verbalizes/displays adequate  comfort level or baseline comfort level  Outcome: Progressing  Flowsheets (Taken 7/7/2025 8819)  Verbalizes/displays adequate comfort level or baseline comfort level:   Encourage patient to monitor pain and request assistance   Assess pain using appropriate pain scale   Administer analgesics based on type and severity of pain and evaluate response   Implement non-pharmacological measures as appropriate and evaluate response   Consider cultural and social influences on pain and pain management   Notify Licensed Independent Practitioner if interventions unsuccessful or patient reports new pain     Problem: Safety - Adult  Goal: Free from fall injury  Outcome: Progressing     Problem: Discharge Planning  Goal: Discharge to home or other facility with appropriate resources  Outcome: Progressing

## 2025-07-07 NOTE — ANESTHESIA PREPROCEDURE EVALUATION
Patient: Georgia Angel    Evaluation Method: In-person visit    Procedure Information    Date: 25  Procedure: Labor Consult     Pt  @ 39 weeks, admitted for IOL in s/o DFM.     Relevant Problems   Anesthesia (within normal limits)      Cardiac (within normal limits)      Pulmonary (within normal limits)      Neuro  Migraines with pregnancy, tylenol PRN      GI (within normal limits)      /Renal (within normal limits)      Liver (within normal limits)      Endocrine (within normal limits)      Hematology (within normal limits)      Musculoskeletal (within normal limits)      GYN   (+) 39 weeks gestation of pregnancy ([facility])       Clinical information reviewed:   Tobacco  Allergies  Meds   Med Hx  Surg Hx   Fam Hx          NPO Detail:  No data recorded     OB/Gyn Evaluation    Present Pregnancy    Patient is pregnant now.   Obstetric History                Physical Exam    Airway  Mallampati: II  TM distance: >3 FB  Neck ROM: full  Mouth opening: 3 or more finger widths     Cardiovascular    Dental    Pulmonary    Abdominal      Other findings: Upper left implant molar. Intact         Anesthesia Plan    History of general anesthesia?: no  History of complications of general anesthesia?: unknown/emergency    (I informed and discussed the risks and benefits of general, spinal and epidural anesthesia with the patient.  The patient expressed her understanding and her questions were answered.  A verbal consent was given by the patient.   )  Anesthetic plan and risks discussed with patient.  Use of blood products discussed with patient who consented to blood products.

## 2025-07-07 NOTE — PROCEDURES
Georgia HINTON Angel, a  at 39w0d with an KADEEM of 2025, by Ultrasound, was seen at [facility] for a nonstress test.    Non-Stress Test   Baseline Fetal Heart Rate for Non-Stress Test: 150 BPM  Variability in Waveform for Non-Stress Test: Moderate  Accelerations in Non-Stress Test: Yes, greater than/equal to 15 bpm, lasting at least 15 seconds  Decelerations in Non-Stress Test: None  Contractions in Non-Stress Test: Not present  Interpretation of Non-Stress Test   Interpretation of Non-Stress Test: Reactive

## 2025-07-07 NOTE — H&P
Obstetrical Admission History and Physical    Chief Complaint: Scheduled Induction (Pt was seen in office for visit & stated that she has not felt that the baby has moved as much this past week.  )    Subjective    Georgia Angel is a 29 y.o.  at 39w0d. Estimated Date of Delivery: 25 Presenting for induction of labor in the setting of [facility]     Pregnancy Complications:   No pregnancy complications identified.    Obstetrical History   # 1 - Date: 10/06/23, Sex: Male, Weight: 3.33 kg, GA: 39w3d, Type: Vaginal, Spontaneous, Apgar1: 9, Apgar5: 9, Living: Living, Birth Comments: None    # 2 - Date: 24, Sex: None, Weight: None, GA: 7w0d, Type: Complete, Apgar1: None, Apgar5: None, Living: None, Birth Comments: None    # 3 - Date: None, Sex: None, Weight: None, GA: None, Type: None, Apgar1: None, Apgar5: None, Living: None, Birth Comments: None    Gynecological History  STD/HIV Risks: none  Pap Smears: no abnormalities    Past Medical History  The patient has a past medical history of Anemia, unspecified (2013), Encounter for gynecological examination (general) (routine) without abnormal findings (08/10/2020), History of abnormal cervical Pap smear, Seasonal allergies, and UTI (urinary tract infection).     Past Surgical History   The patient has a past surgical history that includes MR angio head wo IV contrast (2023).     Social History  The patient reports that she has never smoked. She has never used smokeless tobacco. She reports that she does not currently use alcohol. She reports that she does not use drugs.     Family History  The patient's family history includes Cancer in her father and maternal grandmother.    Genetic History  Genetic Screening Genetic Screening/Teratology Counseling- Includes patient, baby's father, or anyone in either family with:      Positive       Down syndrome   Yes        Intellectual disability and/or autism   Yes        Medications (including supplements,  vitamins, herbs, or OTC drugs)/illicit/recreational drugs/alcohol since last menstrual period   Yes                  Negative       Patient's age 35 years or older as of estimated date of delivery   No        Thalassemia (Italian, Greek, Mediterranean, or  background): MCV less than 80   No        Neural tube defect (Meningomyelocele, Spina bifida, or Anencephaly)   No        Congenital heart defect   No        Yoni-Sachs (Ashkenazi Baptism, Cajun, [place])   No        Canavan disease (Ashkenazi Baptism)   No        Familial dysautonomia (Ashkenazi Baptism)   No        Sickle cell disease or trait ()   No        Hemophilia or other blood disorders   No        Muscular dystrophy   No        Cystic fibrosis   No        Keystone's chorea   No        Other inherited genetic or chromosomal disorder   No        Maternal metabolic disorder (eg. Type 1 diabetes, PKU)   No        Patient or baby's father had child with birth defects not listed above   No        Recurrent pregnancy loss, or a stillbirth   No                        Allergies  Penicillins     Medications  Prescriptions Prior to Admission[1]     Review of Systems:  Pertinent items are noted in HPI.    Objective    Physical Examination  Vital Signs:  /68   Pulse 93    GENERAL: Examination reveals a well developed, well nourished, gravid female in no acute distress. She is alert and cooperative.  HEENT: conjunctiva clear.   LUNGS: clear to auscultation bilaterally.  HEART: regular rate and rhythm, S1, S2 normal, no murmur, click, rub or gallop  ABDOMEN: soft, gravid, nontender, nondistended, no abnormal masses, no epigastric pain.  PELVIC:       Vagina: deferred       Cervical Exam: 1 cm dilated, 50 effaced, -2 station, cervical position posterior, consistency soft, presenting part vertex      Membrane status: intact      Presentation: vertex      Estimated fetal weight: 7# 12 oz by leopold's.   EXTREMITIES: no redness or tenderness in the  calves or thighs, no edema. Good muscle tone with no atrophy.  SKIN: normal coloration and turgor, no rashes.  NEUROLOGICAL: reflexes are DTRs normal and symmetrical. Normal sensation in all extremities.  PSYCHOLOGICAL: mood normal    Fetal Monitoring      Baseline FHR: 145 per minute  Variability: moderate  Accelerations: yes  Decelerations: none  TOCO: irregular          Lab Review      Lab Review:  Results for orders placed or performed in visit on 06/18/25   CBC    Collection Time: 06/18/25  4:14 PM   Result Value Ref Range    WBC 10.8 4.4 - 11.3 x10*3/uL    nRBC 0.0 0.0 - 0.0 /100 WBCs    RBC 3.95 (L) 4.00 - 5.20 x10*6/uL    Hemoglobin 12.6 12.0 - 16.0 g/dL    Hematocrit 37.6 36.0 - 46.0 %    MCV 95 80 - 100 fL    MCH 31.9 26.0 - 34.0 pg    MCHC 33.5 32.0 - 36.0 g/dL    RDW 12.2 11.5 - 14.5 %    Platelets 177 150 - 450 x10*3/uL   Comprehensive Metabolic Panel    Collection Time: 06/18/25  4:14 PM   Result Value Ref Range    Glucose 115 (H) 74 - 99 mg/dL    Sodium 137 136 - 145 mmol/L    Potassium 3.8 3.5 - 5.3 mmol/L    Chloride 104 98 - 107 mmol/L    Bicarbonate 25 21 - 32 mmol/L    Anion Gap 12 10 - 20 mmol/L    Urea Nitrogen 10 6 - 23 mg/dL    Creatinine 0.41 (L) 0.50 - 1.05 mg/dL    eGFR >90 >60 mL/min/1.73m*2    Calcium 8.9 8.6 - 10.3 mg/dL    Albumin 3.4 3.4 - 5.0 g/dL    Alkaline Phosphatase 95 33 - 110 U/L    Total Protein 6.0 (L) 6.4 - 8.2 g/dL    AST 13 9 - 39 U/L    Bilirubin, Total 0.8 0.0 - 1.2 mg/dL    ALT 10 7 - 45 U/L   Lactate Dehydrogenase    Collection Time: 06/18/25  4:14 PM   Result Value Ref Range     84 - 246 U/L   Protein, Urine Random    Collection Time: 06/18/25  4:14 PM   Result Value Ref Range    Total Protein, Urine Random 10 5 - 24 mg/dL    Creatinine, Urine Random 54.0 20.0 - 320.0 mg/dL    T. Protein/Creatinine Ratio 0.19 (H) 0.00 - 0.17 mg/mg Creat   SST TOP    Collection Time: 06/18/25  4:14 PM   Result Value Ref Range    Extra Tube Hold for add-ons.       Mom Prenatal  Results      Prenatal Results      1st Trimester       Test Value Reference Range Date Time    CBC w/ Reflex  Abnormal   (See Report)    04/18/25 1020       (See Report)    12/17/24 1024    HGB  12.6 g/dL 12.0 - 16.0 06/18/25 1614       12.2 g/dL 12.0 - 16.0 04/18/25 1020       12.3 g/dL 12.0 - 16.0 12/17/24 1024    HCT  37.6 % 36.0 - 46.0 06/18/25 1614       38.6 % 36.0 - 46.0 04/18/25 1020       38.0 % 36.0 - 46.0 12/17/24 1024    Platelet Count  177 x10*3/uL 150 - 450 06/18/25 1614       182 x10*3/uL 150 - 450 04/18/25 1020       242 x10*3/uL 150 - 450 12/17/24 1024    MCV  95 fL 80 - 100 06/18/25 1614       100 fL 80 - 100 04/18/25 1020       89 fL 80 - 100 12/17/24 1024    Blood type (ABO)  A   12/17/24 1024    Rh Type  POS   12/17/24 1024    Antibody Screen  NEG   12/17/24 1024    Hemoglobin Identification        Manual Hemoglobin Identification        Chlamydia and Gonorrhea Screening  (See Report)    11/22/24 1147    Chlamydia Screen  Negative  Negative 11/22/24 1147    Gonorrhea Screen  Negative  Negative 11/22/24 1147    Syphilis Screening w/ Reflex  Nonreactive  Nonreactive 12/17/24 1024    Rubella  Positive  Negative 12/17/24 1024    Rubella IgG Index  1.0 IA <=0.7 IA 12/17/24 1024    Hep C  Nonreactive  Nonreactive 12/17/24 1024    Urine Culture  No significant growth   11/22/24 1147    Map Positive Urine GBS for Storyboard        P/C Ratio  0.19 mg/mg Creat 0.00 - 0.17 06/18/25 1614    HBsAg  Nonreactive  Nonreactive 12/17/24 1024    HIV-1 and HIV-2 Antibodies  Nonreactive  Nonreactive 12/17/24 1024    TSH with Reflex         T4 Free        Varicella        Pap Smear        HbA1c  4.9 % See comment 12/17/24 1024    1 Hour Glucola  73 mg/dL <135 04/18/25 1015    Fasting Glucose Tolerance 3 hour        GTT, 1 hour        GTT, 2 hour        GTT, 3 hour        cfDNA  See Scanned Result   12/17/24 1024          2nd Trimester       Test Value Reference Range Date Time    CBC w/ Reflex  Abnormal   (See  Report)    25 1020       (See Report)    24 1024    HGB  12.6 g/dL 12.0 - 16.0 25 1614       12.2 g/dL 12.0 - 16.0 25 1020       12.3 g/dL 12.0 - 16.0 24 1024    HCT  37.6 % 36.0 - 46.0 25 1614       38.6 % 36.0 - 46.0 25 1020       38.0 % 36.0 - 46.0 24 1024    Platelet Count  177 x10*3/uL 150 - 450 25 1614       182 x10*3/uL 150 - 450 25 1020       242 x10*3/uL 150 - 450 24 1024    MCV  95 fL 80 - 100 25 1614       100 fL 80 - 100 25 1020       89 fL 80 - 100 24 1024    Blood Type (ABO)  A   24 1024    Rh Type        Antibody Screen  NEG   24 1024    Chlamydia and Gohorrhea Screening  (See Report)    24 1147    Chlamydia Screen  Negative  Negative 24 1147    Gonorrhea Screen  Negative  Negative 24 1147    Syphilis Screening w/ Reflex  Nonreactive  Nonreactive 24 1024    HbA1c  4.9 % See comment 24 1024    1 Hour Glucola  73 mg/dL <135 25 1015    Fasting Glucose Tolerance 3 hour        GTT, 1 hour        GTT, 2 hours        GTT, 3 hours              3rd Trimester       Test Value Reference Range Date Time    GBS        CBC w/ Reflex  Abnormal   (See Report)    25 1020       (See Report)    24 1024    HGB        HCT        Platelet Count        MCV              Legend    ^: Historical                         Assessment/Plan   Georgia Angel is a 29 y.o.  at 39w0d. Estimated Date of Delivery: 25  Assessment & Plan  Encounter for induction of labor    Fetal Heart Tracing Category 1      Plan:   Admit for Induction of labor - in the setting of DFM  Iol with pitocin per guidelines and AROM when appropriate  Monitor vital signs per unit protocol  Routine orders and labs  Fetal monitoring CEFM  GBS negative  Type and screen  Encourage frequent position changes  Pain management as desired  Continue assessment of maternal and fetal well-being  Recheck as clinically  indicted by maternal or fetal status  Anticipate SVB  POC reviewed with Dr. Scott who is in agreement with POC    DERRICK Tolliver        [1]   Medications Prior to Admission   Medication Sig Dispense Refill Last Dose/Taking    prenatal no115/iron/folic acid (PRENATAL 19 ORAL) Take by mouth.   7/7/2025 at  8:00 AM

## 2025-07-08 LAB — TREPONEMA PALLIDUM IGG+IGM AB [PRESENCE] IN SERUM OR PLASMA BY IMMUNOASSAY: NONREACTIVE

## 2025-07-08 PROCEDURE — 59409 OBSTETRICAL CARE: CPT | Performed by: OBSTETRICS & GYNECOLOGY

## 2025-07-08 PROCEDURE — 2500000001 HC RX 250 WO HCPCS SELF ADMINISTERED DRUGS (ALT 637 FOR MEDICARE OP): Performed by: ADVANCED PRACTICE MIDWIFE

## 2025-07-08 PROCEDURE — 59400 OBSTETRICAL CARE: CPT | Performed by: OBSTETRICS & GYNECOLOGY

## 2025-07-08 PROCEDURE — 7210000002 HC LABOR PER HOUR

## 2025-07-08 PROCEDURE — 7100000016 HC LABOR RECOVERY PER HOUR

## 2025-07-08 PROCEDURE — 1220000001 HC OB SEMI-PRIVATE ROOM DAILY

## 2025-07-08 PROCEDURE — 3E033VJ INTRODUCTION OF OTHER HORMONE INTO PERIPHERAL VEIN, PERCUTANEOUS APPROACH: ICD-10-PCS | Performed by: OBSTETRICS & GYNECOLOGY

## 2025-07-08 RX ORDER — BISACODYL 10 MG/1
10 SUPPOSITORY RECTAL DAILY PRN
Status: DISCONTINUED | OUTPATIENT
Start: 2025-07-08 | End: 2025-07-09 | Stop reason: HOSPADM

## 2025-07-08 RX ORDER — ACETAMINOPHEN 325 MG/1
975 TABLET ORAL EVERY 6 HOURS SCHEDULED
Status: DISCONTINUED | OUTPATIENT
Start: 2025-07-08 | End: 2025-07-09 | Stop reason: HOSPADM

## 2025-07-08 RX ORDER — LABETALOL HYDROCHLORIDE 5 MG/ML
20 INJECTION, SOLUTION INTRAVENOUS ONCE AS NEEDED
Status: DISCONTINUED | OUTPATIENT
Start: 2025-07-08 | End: 2025-07-09 | Stop reason: HOSPADM

## 2025-07-08 RX ORDER — NIFEDIPINE 10 MG/1
10 CAPSULE ORAL ONCE AS NEEDED
Status: DISCONTINUED | OUTPATIENT
Start: 2025-07-08 | End: 2025-07-09 | Stop reason: HOSPADM

## 2025-07-08 RX ORDER — MISOPROSTOL 200 UG/1
800 TABLET ORAL ONCE AS NEEDED
Status: DISCONTINUED | OUTPATIENT
Start: 2025-07-08 | End: 2025-07-09 | Stop reason: HOSPADM

## 2025-07-08 RX ORDER — ADHESIVE BANDAGE
10 BANDAGE TOPICAL
Status: DISCONTINUED | OUTPATIENT
Start: 2025-07-08 | End: 2025-07-09 | Stop reason: HOSPADM

## 2025-07-08 RX ORDER — AMOXICILLIN 250 MG
2 CAPSULE ORAL NIGHTLY PRN
Status: DISCONTINUED | OUTPATIENT
Start: 2025-07-08 | End: 2025-07-09 | Stop reason: HOSPADM

## 2025-07-08 RX ORDER — ONDANSETRON HYDROCHLORIDE 2 MG/ML
4 INJECTION, SOLUTION INTRAVENOUS EVERY 6 HOURS PRN
Status: DISCONTINUED | OUTPATIENT
Start: 2025-07-08 | End: 2025-07-09 | Stop reason: HOSPADM

## 2025-07-08 RX ORDER — POLYETHYLENE GLYCOL 3350 17 G/17G
17 POWDER, FOR SOLUTION ORAL 2 TIMES DAILY PRN
Status: DISCONTINUED | OUTPATIENT
Start: 2025-07-08 | End: 2025-07-09 | Stop reason: HOSPADM

## 2025-07-08 RX ORDER — SIMETHICONE 80 MG
80 TABLET,CHEWABLE ORAL 4 TIMES DAILY PRN
Status: DISCONTINUED | OUTPATIENT
Start: 2025-07-08 | End: 2025-07-09 | Stop reason: HOSPADM

## 2025-07-08 RX ORDER — CARBOPROST TROMETHAMINE 250 UG/ML
250 INJECTION, SOLUTION INTRAMUSCULAR ONCE AS NEEDED
Status: DISCONTINUED | OUTPATIENT
Start: 2025-07-08 | End: 2025-07-09 | Stop reason: HOSPADM

## 2025-07-08 RX ORDER — DIPHENHYDRAMINE HYDROCHLORIDE 50 MG/ML
25 INJECTION, SOLUTION INTRAMUSCULAR; INTRAVENOUS EVERY 6 HOURS PRN
Status: DISCONTINUED | OUTPATIENT
Start: 2025-07-08 | End: 2025-07-09 | Stop reason: HOSPADM

## 2025-07-08 RX ORDER — TRANEXAMIC ACID 1 G/10ML
1000 INJECTION, SOLUTION INTRAVENOUS ONCE AS NEEDED
Status: DISCONTINUED | OUTPATIENT
Start: 2025-07-08 | End: 2025-07-09 | Stop reason: HOSPADM

## 2025-07-08 RX ORDER — ONDANSETRON 4 MG/1
4 TABLET, FILM COATED ORAL EVERY 6 HOURS PRN
Status: DISCONTINUED | OUTPATIENT
Start: 2025-07-08 | End: 2025-07-09 | Stop reason: HOSPADM

## 2025-07-08 RX ORDER — OXYTOCIN/0.9 % SODIUM CHLORIDE 30/500 ML
PLASTIC BAG, INJECTION (ML) INTRAVENOUS
Status: DISPENSED
Start: 2025-07-08 | End: 2025-07-08

## 2025-07-08 RX ORDER — LOPERAMIDE HYDROCHLORIDE 2 MG/1
4 CAPSULE ORAL EVERY 2 HOUR PRN
Status: DISCONTINUED | OUTPATIENT
Start: 2025-07-08 | End: 2025-07-09 | Stop reason: HOSPADM

## 2025-07-08 RX ORDER — OXYTOCIN 10 [USP'U]/ML
10 INJECTION, SOLUTION INTRAMUSCULAR; INTRAVENOUS ONCE AS NEEDED
Status: DISCONTINUED | OUTPATIENT
Start: 2025-07-08 | End: 2025-07-09 | Stop reason: HOSPADM

## 2025-07-08 RX ORDER — DIPHENHYDRAMINE HCL 25 MG
25 TABLET ORAL EVERY 6 HOURS PRN
Status: DISCONTINUED | OUTPATIENT
Start: 2025-07-08 | End: 2025-07-09 | Stop reason: HOSPADM

## 2025-07-08 RX ORDER — METHYLERGONOVINE MALEATE 0.2 MG/ML
0.2 INJECTION INTRAVENOUS ONCE AS NEEDED
Status: DISCONTINUED | OUTPATIENT
Start: 2025-07-08 | End: 2025-07-09 | Stop reason: HOSPADM

## 2025-07-08 RX ORDER — IBUPROFEN 600 MG/1
600 TABLET, FILM COATED ORAL EVERY 6 HOURS SCHEDULED
Status: DISCONTINUED | OUTPATIENT
Start: 2025-07-08 | End: 2025-07-09 | Stop reason: HOSPADM

## 2025-07-08 RX ORDER — HYDRALAZINE HYDROCHLORIDE 20 MG/ML
5 INJECTION INTRAMUSCULAR; INTRAVENOUS ONCE AS NEEDED
Status: DISCONTINUED | OUTPATIENT
Start: 2025-07-08 | End: 2025-07-09 | Stop reason: HOSPADM

## 2025-07-08 RX ADMIN — ACETAMINOPHEN 975 MG: 325 TABLET ORAL at 09:30

## 2025-07-08 RX ADMIN — IBUPROFEN 600 MG: 600 TABLET ORAL at 20:58

## 2025-07-08 RX ADMIN — ACETAMINOPHEN 975 MG: 325 TABLET ORAL at 15:34

## 2025-07-08 RX ADMIN — ACETAMINOPHEN 975 MG: 325 TABLET ORAL at 20:58

## 2025-07-08 RX ADMIN — IBUPROFEN 600 MG: 600 TABLET ORAL at 15:34

## 2025-07-08 RX ADMIN — IBUPROFEN 600 MG: 600 TABLET ORAL at 09:30

## 2025-07-08 ASSESSMENT — PAIN SCALES - GENERAL
PAINLEVEL_OUTOF10: 8
PAINLEVEL_OUTOF10: 7
PAINLEVEL_OUTOF10: 2
PAINLEVEL_OUTOF10: 6
PAINLEVEL_OUTOF10: 0 - NO PAIN
PAINLEVEL_OUTOF10: 0 - NO PAIN
PAINLEVEL_OUTOF10: 3
PAINLEVEL_OUTOF10: 0 - NO PAIN
PAINLEVEL_OUTOF10: 8
PAINLEVEL_OUTOF10: 0 - NO PAIN
PAINLEVEL_OUTOF10: 0 - NO PAIN
PAINLEVEL_OUTOF10: 5 - MODERATE PAIN
PAINLEVEL_OUTOF10: 10 - WORST POSSIBLE PAIN
PAINLEVEL_OUTOF10: 0 - NO PAIN
PAINLEVEL_OUTOF10: 2
PAINLEVEL_OUTOF10: 8
PAINLEVEL_OUTOF10: 0 - NO PAIN
PAINLEVEL_OUTOF10: 0 - NO PAIN
PAINLEVEL_OUTOF10: 3
PAINLEVEL_OUTOF10: 0 - NO PAIN

## 2025-07-08 NOTE — PROGRESS NOTES
Intrapartum Progress Note    Assessment/Plan   Georgia Angel is a 29 y.o.  at 39w1d. KADEEM: 2025, by Ultrasound here for IOL.    IOL  -pitocin, currently at 28 units, decreased to 14  -SROM  -//-2  -anticipate  shortly  -desires NCB    Subjective   Getting uncomfortable    Objective   Last Vitals:  Temp Pulse Resp BP MAP Pulse Ox   36.6 °C (97.9 °F) 78 18 100/61 75 (!) 90 %     Vitals Min/Max Last 24 Hours:  Temp  Min: 36.5 °C (97.7 °F)  Max: 37 °C (98.6 °F)  Pulse  Min: 72  Max: 93  Resp  Min: 16  Max: 18  BP  Min: 98/57  Max: 127/83  MAP (mmHg)  Min: 72  Max: 102    Intake/Output:  No intake or output data in the 24 hours ending 25 0429    Physical Examination:  FHR is  , with Accelerations, and a   tracing.    Bud reading:    CERVIX: 5 cm dilated, 70 % effaced, -1 station; MEMBRANES are SROM    Chaperone Present: Yes.  Chaperone Name/Title: Raiza Patel RN

## 2025-07-08 NOTE — PROGRESS NOTES
Intrapartum Progress Note    Assessment/Plan   Georgia Angel is a 29 y.o.  at 39w1d. KADEEM: 2025, by Ultrasound here for IOL.    IOL  -on pitocin, currently at 14  -making cervical change  -desires NCB, partner at bedside and supportive,  en route to hospital  -anticipate  shortly    Subjective   Uncomfortable, feels the need to push    Objective   Last Vitals:  Temp Pulse Resp BP MAP Pulse Ox   36.9 °C (98.4 °F) 81 16 (!) 131/92 106 97 %     Vitals Min/Max Last 24 Hours:  Temp  Min: 36.5 °C (97.7 °F)  Max: 37 °C (98.6 °F)  Pulse  Min: 72  Max: 93  Resp  Min: 16  Max: 18  BP  Min: 98/57  Max: 131/92  MAP (mmHg)  Min: 72  Max: 106    Intake/Output:  No intake or output data in the 24 hours ending 25 0551    Physical Examination:  FHR is  , with Accelerations, and a   tracing.    [place] reading:    CERVIX: 8 cm dilated, 70 % effaced, -1 station; MEMBRANES are SROM    Chaperone Present: Yes.  Chaperone Name/Title: Raiza Patel RN

## 2025-07-08 NOTE — L&D DELIVERY NOTE
Vaginal Delivery Note    Patient Name: Georgia Angel  : 1995  MRN: [id number]  Age: 29 y.o.    /Para:   Gestational Age: 39w1d    Date of Delivery: 2025    Procedure: Normal Spontaneous Vaginal Delivery    Delivery Provider: Evelina Burnette MD      Resident/Fellow/Other Assistant: Marry Florian CNM    Description of Procedure:  Delivery of viable infant under no anesthesia. Delayed clamping was performed. The infant was placed skin to skin. Cord gases were not sent.  Cord blood was collected. Placenta delivered intact and fundus was firm    1st Laceration identified and hemostatic, not requiring repair.     Additional Procedures:  None    Findings:   Amniotic fluid Clear, Male infant in Vertex Occiput Anterior presentation, APGARS 8 , 9 .  Birth Weight 3.23 kg.    Complications: None    Quantitative Blood Loss:   Delivery QBL: 140 mL (2025  7:20 AM - 2025 10:28 AM)    Blood products:      Uterotonics/Hemostatic Agent: IV Pitocin 30 units    Specimen:   Placenta  Delivered: 2025  7:28 AM  Appearance: Intact  Removal: Spontaneous    Disposition: discarded    Sponge/Instrument/Needle Counts: The sponge, lap and needle counts were correct.    Patient Disposition: Patient recovering on labor and delivery in stable condition.    Дмитрий Angel [[id number]]      Labor Events    Sac identifier: Sac 1  Rupture date/time: 2025 0410  Rupture type: Spontaneous  Fluid color: Clear  Fluid odor: None  Labor type: Induced Onset of Labor  Labor allowed to proceed with plans for an attempted vaginal birth?: Yes  Induction: Oxytocin  First cervical ripening date/time: 2025 1800  Induction indications: Other  Complications: None       Labor Event Times    Dilation complete date/time: 2025 0715  Start pushing date/time: 2025 07:15       Labor Length    2nd stage: 0h 05m  3rd stage: 0h 07m       Placenta    Placenta delivery date/time: 2025 07:28  Placenta removal:  Spontaneous  Placenta appearance: Intact  Placenta disposition: discarded       Cord    Vessels: 3 vessels  Complications: Nuchal  Nuchal intervention: reduced  Nuchal cord description: loose nuchal cord  Number of loops: 1  Delayed cord clamping?: Yes  Cord clamped date/time: 2025 07:25:00  Cord blood disposition: Discarded  Gases sent?: No  Stem cell collection (by provider): No       Lacerations    Episiotomy: None  Perineal laceration: 1st  Perineal laceration repaired?: No  Other lacerations?: No  Repair suture: None       Anesthesia    Method: None       Operative Delivery    Forceps attempted?: No  Vacuum extractor attempted?: No       Shoulder Dystocia    Shoulder dystocia present?: No       [place] Delivery    Time head delivered: 2025 07:20:20  Birth date/time: 2025 07:20:58  Delivery type: Vaginal, Spontaneous  Complications: None       Resuscitation    Method: Tactile stimulation       Apgars    Living status: Living  Apgar Component Scores:  1 min.:  5 min.:  10 min.:  15 min.:  20 min.:    Skin color:  0  1       Heart rate:  2  2       Reflex irritability:  2  2       Muscle tone:  2  2       Respiratory effort:  2  2       Total:  8  9       Apgars assigned by: MARCUS MCKEE       Delivery Providers    Delivering clinician: Evelina Burnette MD   Provider Role    Monica Patel, RN Delivery Nurse    Pura Valdovinos, RN Nursery Nurse     Resident    Noreen Turpin RN Nursery Nurse    Marry Florian, APRN-CNM Midwife

## 2025-07-08 NOTE — CARE PLAN
The patient's goals for the shift include Have a baby    The clinical goals for the shift include Reassuring FHR in labor      Problem: Pain - Adult  Goal: Verbalizes/displays adequate comfort level or baseline comfort level  Outcome: Progressing  Flowsheets (Taken 2025 by Ofelia Campo, RN)  Verbalizes/displays adequate comfort level or baseline comfort level:   Encourage patient to monitor pain and request assistance   Assess pain using appropriate pain scale   Administer analgesics based on type and severity of pain and evaluate response   Implement non-pharmacological measures as appropriate and evaluate response   Consider cultural and social influences on pain and pain management   Notify Licensed Independent Practitioner if interventions unsuccessful or patient reports new pain     Problem: Safety - Adult  Goal: Free from fall injury  Outcome: Progressing  Flowsheets (Taken 2025 by Ofelia Campo, RN)  Free from fall injury:   Instruct family/caregiver on patient safety   Based on caregiver fall risk screen, instruct family/caregiver to ask for assistance with transferring infant if caregiver noted to have fall risk factors     Problem: Discharge Planning  Goal: Discharge to home or other facility with appropriate resources  Outcome: Progressing  Flowsheets (Taken 2025)  Discharge to home or other facility with appropriate resources: Identify barriers to discharge with patient and caregiver     Problem: Postpartum  Goal: Experiences normal postpartum course  Outcome: Progressing  Flowsheets (Taken 2025)  Experiences normal postpartum course:   Monitor maternal vital signs   Assess uterine involution   Med administration/monitoring of effect     Problem: Vaginal Birth or  Section  Goal: Fetal and maternal status remain reassuring during the birth process  Outcome: Met  Flowsheets (Taken 2025 by Ofelia Campo, RN)  Fetal and maternal status  remain reassuring during the birth process:   Monitor vital signs   Monitor fetal heart rate   Monitor labor progression (Vaginal delivery)   Med administration/monitoring of effect  Goal: Demonstrates labor coping techniques through delivery  Outcome: Met  Flowsheets (Taken 7/7/2025 1659 by Ofelia Campo, RAFI)  Demonstrates labor coping techniques through delivery:   Positioning, turning, and/or use of birthing ball assistance   Breathing/relaxation assistance  Goal: Minimal s/sx of HDP and BP<160/110  Outcome: Met  Flowsheets (Taken 7/7/2025 1659 by Ofelia Campo RN)  Minimal s/sx of HDP and BP <160/110:   Med administration/monitoring of effect   Monitor QBL and vital signs  Goal: No s/sx of infection through recovery  Outcome: Met  Flowsheets (Taken 7/7/2025 1659 by Ofelia Campo RN)  No s/sx of infection through recovery: Monitor QBL and vital signs  Goal: No s/sx of hemorrhage through recovery  Outcome: Met  Flowsheets (Taken 7/7/2025 1659 by Ofelia Campo, RN)  No s/sx of hemorrhage through recovery: Monitor QBL and vital signs

## 2025-07-08 NOTE — PROGRESS NOTES
Cervix: 8/100/-1  FHT: baseline 140, moderate variability, accelerations, no decelerations  [place]: q 1-2 min  Coping well with natural labor,  and partner at bedside and supportive  Anticipate  shortly    Evelina Burnette MD  25  6:48 AM

## 2025-07-08 NOTE — LACTATION NOTE
"Lactation Consultant Note  Lactation Consultation  Reason for Consult: Initial assessment  Consultant Name: Lulu ABBEY Quinn RN IBCLC    Maternal Information  Has mother  before?: Yes  How long did the mother previously breastfeed?: 11months 1.4yo son  Previous Maternal Breastfeeding Challenges: Tongue tie, Other (Comment) (NB gas TOTR - clipped)  Infant to breast within first 2 hours of birth?: Yes  Exclusive Pump and Bottle Feed: No    Maternal Assessment       Infant Assessment       Feeding Assessment       LATCH TOOL       Breast Pump       Other OB Lactation Tools       Patient Follow-up       Other OB Lactation Documentation       Recommendations/Summary  RN IBCLC  in room mother requesting.  Mother/\"[place]\" is 29yo   on 25 at 0720 for viable boy \"[place]alo\" at 39.1weeks gestation. NB BW 3230 Apgar 8&9. Mother plans to EBF and asking about TOT as her almost 3yo didhave tongue tie. [place] is extremely pleasant and endorses just finished feed with NB. NB is content with out any hunger cues noted. Mother endorses feeding is going better than with previous son and denies pain. NB does have slightly recessed chin. LC encouraged mother to call at next feed when NB waking and LC can check oral anatomy and give latch assessment.  Educated parents on skin to skin, hand expression, hunger cues and feeding frequency/patterns. Discussed expected output, weight loss <10%, and normal bilirubin. Educated on AAP pacifier recommendations. Reviewed outpatient resources.   Cue based feeding, at least 8-12 times in 24 hours  Frequent skin to skin  Hand express for extra volume  Call for assistance as needed     Update 1530: LC in room ad mother wants assistance with gentle waking and to have oral anatomy assessment done. Please see flowsheet. NB spitty and swallowing, latched but did not suckle. Mother needed few reminders on positioning. Mother does well attempting to latch nb independently.  Cue based " feeding, at least 8-12 times in 24 hours  Frequent skin to skin  Hand express for extra volume  Call for assistance as needed     Update: LC called to room. NB pink and deep sleep in mothers arms, hands relaxed, NB had large spit up and large stool. Will try to latch again in 1hour

## 2025-07-08 NOTE — PROGRESS NOTES
Intrapartum Progress Note    Assessment/Plan   Georgia Angel is a 29 y.o.  at 39w0d. KADEEM: 2025, by Ultrasound here for IOL for DFM > 39 weeks.     IOL  -on pitocin, now at 14  - cervix now 2/50/-3, too posterior for AROM   - continue pitocin per protocol and plan for AROM at next check if able    Subjective   Requesting cervical exam    Objective   Last Vitals:  Temp Pulse Resp BP MAP Pulse Ox   37 °C (98.6 °F) 73 18 107/61 77 (!) 95 %     Vitals Min/Max Last 24 Hours:  Temp  Min: 36.5 °C (97.7 °F)  Max: 37 °C (98.6 °F)  Pulse  Min: 73  Max: 93  Resp  Min: 18  Max: 18  BP  Min: 106/68  Max: 127/83  MAP (mmHg)  Min: 77  Max: 102    Intake/Output:  No intake or output data in the 24 hours ending 25 2305    Physical Examination:  FHR is  , with Accelerations, and a   tracing.    [place] reading:    CERVIX: 2 cm dilated, 50 % effaced, -2 station; MEMBRANES are Intact    Chaperone Present: Yes.  Chaperone Name/Title: Monica Patel RN

## 2025-07-09 VITALS
SYSTOLIC BLOOD PRESSURE: 105 MMHG | WEIGHT: 173.94 LBS | TEMPERATURE: 97.8 F | HEIGHT: 65 IN | RESPIRATION RATE: 16 BRPM | OXYGEN SATURATION: 98 % | BODY MASS INDEX: 28.98 KG/M2 | HEART RATE: 73 BPM | DIASTOLIC BLOOD PRESSURE: 71 MMHG

## 2025-07-09 PROCEDURE — 2500000001 HC RX 250 WO HCPCS SELF ADMINISTERED DRUGS (ALT 637 FOR MEDICARE OP): Performed by: ADVANCED PRACTICE MIDWIFE

## 2025-07-09 RX ORDER — ACETAMINOPHEN 500 MG
1000 TABLET ORAL EVERY 6 HOURS PRN
COMMUNITY
Start: 2025-07-09

## 2025-07-09 RX ORDER — IBUPROFEN 200 MG
600 TABLET ORAL EVERY 6 HOURS PRN
COMMUNITY
Start: 2025-07-09

## 2025-07-09 RX ADMIN — ACETAMINOPHEN 975 MG: 325 TABLET ORAL at 03:18

## 2025-07-09 RX ADMIN — IBUPROFEN 600 MG: 600 TABLET ORAL at 03:18

## 2025-07-09 ASSESSMENT — PAIN SCALES - GENERAL
PAINLEVEL_OUTOF10: 0 - NO PAIN
PAINLEVEL_OUTOF10: 0 - NO PAIN

## 2025-07-09 NOTE — DISCHARGE SUMMARY
Discharge Summary    Admission Date: 7/7/2025  Discharge Date: 7/9/2025     Discharge Diagnosis  Encounter for induction of labor    Hospital Course  Delivery Date: 7/8/2025 7:20 AM  Delivery type: Vaginal, Spontaneous   GA at delivery: 39w1d  Outcome: Living  Anesthesia during delivery: None  Intrapartum complications: None  Feeding method: Breastfeeding Status: Yes     Procedures: none  Contraception at discharge: none      Pertinent Physical Exam At Time of Discharge  General: Examination reveals a well developed, well nourished, female, in no acute distress. She is alert and cooperative.  Fundus: firm and below umbilicus.  Extremities: no redness or tenderness in the calves or thighs, no edema.  Psychological: awake and alert; oriented to person, place, and time.    Discharge Meds     Your medication list        START taking these medications        Instructions Last Dose Given Next Dose Due   acetaminophen 500 mg tablet  Commonly known as: Tylenol Extra Strength      Take 2 tablets (1,000 mg) by mouth every 6 hours if needed for mild pain (1 - 3).       ibuprofen 200 mg tablet  Commonly known as: Motrin IB      Take 3 tablets (600 mg) by mouth every 6 hours if needed for mild pain (1 - 3).              CONTINUE taking these medications        Instructions Last Dose Given Next Dose Due   PRENATAL 19 ORAL                     Where to Get Your Medications        You can get these medications from any pharmacy    You don't need a prescription for these medications  acetaminophen 500 mg tablet  ibuprofen 200 mg tablet          Complications Requiring Follow-Up  None    Test Results Pending At Discharge  Pending Labs       No current pending labs.            Outpatient Follow-Up  Future Appointments   Date Time Provider Department Center   8/27/2025 10:30 AM [facility] PT [id number] [place]   9/10/2025 10:30 AM [facility] PT [id number] [place]   9/17/2025 10:30 AM [facility] PT  [id number] [place]       I spent 10 minutes in the professional and overall care of this patient.      Juan Luis Davis MD

## 2025-07-09 NOTE — CARE PLAN
The patient's goals for the shift include bond with baby    The clinical goals for the shift include return to prepregnant state      Problem: Pain - Adult  Goal: Verbalizes/displays adequate comfort level or baseline comfort level  Outcome: Met  Flowsheets (Taken 7/7/2025 1659 by Ofelia Campo, RN)  Verbalizes/displays adequate comfort level or baseline comfort level:   Encourage patient to monitor pain and request assistance   Assess pain using appropriate pain scale   Administer analgesics based on type and severity of pain and evaluate response   Implement non-pharmacological measures as appropriate and evaluate response   Consider cultural and social influences on pain and pain management   Notify Licensed Independent Practitioner if interventions unsuccessful or patient reports new pain     Problem: Safety - Adult  Goal: Free from fall injury  Outcome: Met  Flowsheets (Taken 7/7/2025 1845 by Ofelia Campo RN)  Free from fall injury:   Instruct family/caregiver on patient safety   Based on caregiver fall risk screen, instruct family/caregiver to ask for assistance with transferring infant if caregiver noted to have fall risk factors     Problem: Discharge Planning  Goal: Discharge to home or other facility with appropriate resources  Outcome: Met  Flowsheets (Taken 7/8/2025 0922 by Monica Patel, RN)  Discharge to home or other facility with appropriate resources: Identify barriers to discharge with patient and caregiver     Problem: Postpartum  Goal: Experiences normal postpartum course  Outcome: Met  Flowsheets (Taken 7/8/2025 0922 by Monica Patel, RN)  Experiences normal postpartum course:   Monitor maternal vital signs   Assess uterine involution   Med administration/monitoring of effect

## 2025-07-14 ENCOUNTER — APPOINTMENT (OUTPATIENT)
Dept: OBSTETRICS AND GYNECOLOGY | Facility: CLINIC | Age: 30
End: 2025-07-14
Payer: COMMERCIAL

## 2025-08-13 ENCOUNTER — APPOINTMENT (OUTPATIENT)
Dept: OBSTETRICS AND GYNECOLOGY | Facility: CLINIC | Age: 30
End: 2025-08-13
Payer: COMMERCIAL

## 2025-08-13 VITALS
BODY MASS INDEX: 25.99 KG/M2 | OXYGEN SATURATION: 97 % | HEART RATE: 63 BPM | DIASTOLIC BLOOD PRESSURE: 74 MMHG | WEIGHT: 156 LBS | HEIGHT: 65 IN | SYSTOLIC BLOOD PRESSURE: 114 MMHG

## 2025-08-13 PROCEDURE — 0503F POSTPARTUM CARE VISIT: CPT | Performed by: OBSTETRICS & GYNECOLOGY

## 2025-08-13 ASSESSMENT — EDINBURGH POSTNATAL DEPRESSION SCALE (EPDS)
THE THOUGHT OF HARMING MYSELF HAS OCCURRED TO ME: NEVER
TOTAL SCORE: 2
THINGS HAVE BEEN GETTING ON TOP OF ME: NO, I HAVE BEEN COPING AS WELL AS EVER
I HAVE BEEN ANXIOUS OR WORRIED FOR NO GOOD REASON: NO, NOT AT ALL
I HAVE FELT SAD OR MISERABLE: NO, NOT AT ALL
I HAVE BEEN SO UNHAPPY THAT I HAVE BEEN CRYING: NO, NEVER
I HAVE FELT SCARED OR PANICKY FOR NO GOOD REASON: NO, NOT MUCH
I HAVE BEEN SO UNHAPPY THAT I HAVE HAD DIFFICULTY SLEEPING: NOT AT ALL
I HAVE BLAMED MYSELF UNNECESSARILY WHEN THINGS WENT WRONG: NOT VERY OFTEN
I HAVE BEEN ABLE TO LAUGH AND SEE THE FUNNY SIDE OF THINGS: AS MUCH AS I ALWAYS COULD
I HAVE LOOKED FORWARD WITH ENJOYMENT TO THINGS: AS MUCH AS I EVER DID

## 2026-02-13 ENCOUNTER — APPOINTMENT (OUTPATIENT)
Dept: OBSTETRICS AND GYNECOLOGY | Facility: CLINIC | Age: 31
End: 2026-02-13
Payer: COMMERCIAL